# Patient Record
Sex: FEMALE | Race: WHITE | Employment: OTHER | URBAN - METROPOLITAN AREA
[De-identification: names, ages, dates, MRNs, and addresses within clinical notes are randomized per-mention and may not be internally consistent; named-entity substitution may affect disease eponyms.]

---

## 2017-09-05 ENCOUNTER — TRANSFERRED RECORDS (OUTPATIENT)
Dept: HEALTH INFORMATION MANAGEMENT | Facility: CLINIC | Age: 72
End: 2017-09-05

## 2017-09-13 ENCOUNTER — HOSPITAL ENCOUNTER (OUTPATIENT)
Dept: LAB | Facility: CLINIC | Age: 72
Discharge: HOME | End: 2017-09-13
Attending: ORTHOPAEDIC SURGERY | Admitting: ORTHOPAEDIC SURGERY
Payer: MEDICARE

## 2017-09-13 DIAGNOSIS — Z01.812 PRE-OPERATIVE LABORATORY EXAMINATION: ICD-10-CM

## 2017-09-13 PROCEDURE — 87641 MR-STAPH DNA AMP PROBE: CPT | Performed by: ORTHOPAEDIC SURGERY

## 2017-09-13 PROCEDURE — 87640 STAPH A DNA AMP PROBE: CPT | Performed by: ORTHOPAEDIC SURGERY

## 2017-09-13 PROCEDURE — 40000830 ZZHCL STATISTIC STAPH AUREUS METH RESIST SCREEN PCR: Performed by: ORTHOPAEDIC SURGERY

## 2017-09-13 PROCEDURE — 40000829 ZZHCL STATISTIC STAPH AUREUS SUSCEPT SCREEN PCR: Performed by: ORTHOPAEDIC SURGERY

## 2017-09-13 RX ORDER — MULTIPLE VITAMINS W/ MINERALS TAB 9MG-400MCG
1 TAB ORAL DAILY
COMMUNITY

## 2017-09-14 LAB
MRSA DNA SPEC QL NAA+PROBE: NEGATIVE
SPECIMEN SOURCE: NORMAL

## 2017-09-20 NOTE — PHARMACY-ADMISSION MEDICATION HISTORY
Admission medication history interview status for this patient is complete. See [facility] admission navigator for allergy information, prior to admission medications and immunization status.     Med rec completed by pre admitting Martina Torres RN Wed Sep 13, 2017  3:45 PM            Prior to Admission medications    Medication Sig Last Dose Taking? Auth Provider   multivitamin, therapeutic with minerals (MULTI-VITAMIN) TABS tablet Take 1 tablet by mouth daily  Yes Reported, Patient   acetaminophen (TYLENOL) 325 MG tablet Take 3 tablets (975 mg) by mouth every 8 hours  Yes Marialuisa Heard PA-C   LISINOPRIL PO Take 40 mg by mouth daily   Yes Reported, Patient   SIMVASTATIN PO Take 20 mg by mouth At Bedtime  Yes Reported, Patient   OMEPRAZOLE PO Take 20 mg by mouth every morning  Yes Reported, Patient   SUMATRIPTAN SUCCINATE PO Take 100 mg by mouth as needed for migraine (up to 1 dose daily prn)   Yes Reported, Patient

## 2017-09-25 ENCOUNTER — ANESTHESIA EVENT (OUTPATIENT)
Dept: SURGERY | Facility: CLINIC | Age: 72
DRG: 470 | End: 2017-09-25
Payer: MEDICARE

## 2017-09-25 ENCOUNTER — ANESTHESIA (OUTPATIENT)
Dept: SURGERY | Facility: CLINIC | Age: 72
DRG: 470 | End: 2017-09-25
Payer: MEDICARE

## 2017-09-25 ENCOUNTER — APPOINTMENT (OUTPATIENT)
Dept: GENERAL RADIOLOGY | Facility: CLINIC | Age: 72
DRG: 470 | End: 2017-09-25
Attending: ORTHOPAEDIC SURGERY
Payer: MEDICARE

## 2017-09-25 ENCOUNTER — APPOINTMENT (OUTPATIENT)
Dept: PHYSICAL THERAPY | Facility: CLINIC | Age: 72
DRG: 470 | End: 2017-09-25
Attending: ORTHOPAEDIC SURGERY
Payer: MEDICARE

## 2017-09-25 ENCOUNTER — HOSPITAL ENCOUNTER (INPATIENT)
Facility: CLINIC | Age: 72
LOS: 3 days | Discharge: HOME | DRG: 470 | End: 2017-09-28
Attending: ORTHOPAEDIC SURGERY | Admitting: ORTHOPAEDIC SURGERY
Payer: MEDICARE

## 2017-09-25 DIAGNOSIS — Z96.651 S/P TOTAL KNEE ARTHROPLASTY, RIGHT: Primary | ICD-10-CM

## 2017-09-25 PROBLEM — Z96.659 S/P TOTAL KNEE ARTHROPLASTY: Status: ACTIVE | Noted: 2017-09-25

## 2017-09-25 PROCEDURE — 25000128 H RX IP 250 OP 636: Performed by: ANESTHESIOLOGY

## 2017-09-25 PROCEDURE — 25000125 ZZHC RX 250: Performed by: NURSE ANESTHETIST, CERTIFIED REGISTERED

## 2017-09-25 PROCEDURE — 36000093 ZZH SURGERY LEVEL 4 1ST 30 MIN: Performed by: ORTHOPAEDIC SURGERY

## 2017-09-25 PROCEDURE — 0SRC0J9 REPLACEMENT OF RIGHT KNEE JOINT WITH SYNTHETIC SUBSTITUTE, CEMENTED, OPEN APPROACH: ICD-10-PCS | Performed by: ORTHOPAEDIC SURGERY

## 2017-09-25 PROCEDURE — 25000125 ZZHC RX 250: Performed by: ORTHOPAEDIC SURGERY

## 2017-09-25 PROCEDURE — C1776 JOINT DEVICE (IMPLANTABLE): HCPCS | Performed by: ORTHOPAEDIC SURGERY

## 2017-09-25 PROCEDURE — 25800025 ZZH RX 258: Performed by: ORTHOPAEDIC SURGERY

## 2017-09-25 PROCEDURE — 12000007 ZZH R&B INTERMEDIATE

## 2017-09-25 PROCEDURE — 99207 ZZC CONSULT E&M CHANGED TO INITIAL LEVEL: CPT | Performed by: INTERNAL MEDICINE

## 2017-09-25 PROCEDURE — 25000128 H RX IP 250 OP 636: Performed by: NURSE ANESTHETIST, CERTIFIED REGISTERED

## 2017-09-25 PROCEDURE — 97530 THERAPEUTIC ACTIVITIES: CPT | Mod: GP

## 2017-09-25 PROCEDURE — 25000132 ZZH RX MED GY IP 250 OP 250 PS 637: Mod: GY | Performed by: ORTHOPAEDIC SURGERY

## 2017-09-25 PROCEDURE — 25000128 H RX IP 250 OP 636: Performed by: ORTHOPAEDIC SURGERY

## 2017-09-25 PROCEDURE — 71000012 ZZH RECOVERY PHASE 1 LEVEL 1 FIRST HR: Performed by: ORTHOPAEDIC SURGERY

## 2017-09-25 PROCEDURE — 97161 PT EVAL LOW COMPLEX 20 MIN: CPT | Mod: GP

## 2017-09-25 PROCEDURE — 40000985 XR KNEE PORT RT 1/2 VW: Mod: RT

## 2017-09-25 PROCEDURE — 27810169 ZZH OR IMPLANT GENERAL: Performed by: ORTHOPAEDIC SURGERY

## 2017-09-25 PROCEDURE — 40000193 ZZH STATISTIC PT WARD VISIT

## 2017-09-25 PROCEDURE — 36000063 ZZH SURGERY LEVEL 4 EA 15 ADDTL MIN: Performed by: ORTHOPAEDIC SURGERY

## 2017-09-25 PROCEDURE — A9270 NON-COVERED ITEM OR SERVICE: HCPCS | Mod: GY | Performed by: PHYSICIAN ASSISTANT

## 2017-09-25 PROCEDURE — 25000125 ZZHC RX 250: Performed by: PHYSICIAN ASSISTANT

## 2017-09-25 PROCEDURE — 99222 1ST HOSP IP/OBS MODERATE 55: CPT | Performed by: INTERNAL MEDICINE

## 2017-09-25 PROCEDURE — A9270 NON-COVERED ITEM OR SERVICE: HCPCS | Mod: GY | Performed by: ORTHOPAEDIC SURGERY

## 2017-09-25 PROCEDURE — 71000013 ZZH RECOVERY PHASE 1 LEVEL 1 EA ADDTL HR: Performed by: ORTHOPAEDIC SURGERY

## 2017-09-25 PROCEDURE — 25000125 ZZHC RX 250: Performed by: ANESTHESIOLOGY

## 2017-09-25 PROCEDURE — 27210794 ZZH OR GENERAL SUPPLY STERILE: Performed by: ORTHOPAEDIC SURGERY

## 2017-09-25 PROCEDURE — 37000009 ZZH ANESTHESIA TECHNICAL FEE, EACH ADDTL 15 MIN: Performed by: ORTHOPAEDIC SURGERY

## 2017-09-25 PROCEDURE — 25000566 ZZH SEVOFLURANE, EA 15 MIN: Performed by: ORTHOPAEDIC SURGERY

## 2017-09-25 PROCEDURE — 25000132 ZZH RX MED GY IP 250 OP 250 PS 637: Mod: GY | Performed by: PHYSICIAN ASSISTANT

## 2017-09-25 PROCEDURE — 37000008 ZZH ANESTHESIA TECHNICAL FEE, 1ST 30 MIN: Performed by: ORTHOPAEDIC SURGERY

## 2017-09-25 PROCEDURE — 40000171 ZZH STATISTIC PRE-PROCEDURE ASSESSMENT III: Performed by: ORTHOPAEDIC SURGERY

## 2017-09-25 PROCEDURE — 97110 THERAPEUTIC EXERCISES: CPT | Mod: GP

## 2017-09-25 PROCEDURE — 25000128 H RX IP 250 OP 636: Performed by: PHYSICIAN ASSISTANT

## 2017-09-25 DEVICE — BONE CEMENT SIMPLEX FULL DOSE 6191-1-001: Type: IMPLANTABLE DEVICE | Site: KNEE | Status: FUNCTIONAL

## 2017-09-25 DEVICE — IMP ART SURFACE ZIM NEXGEN LPS EF 3-4 12MM 00-5964-032-12: Type: IMPLANTABLE DEVICE | Site: KNEE | Status: FUNCTIONAL

## 2017-09-25 DEVICE — IMP COMP PATELLA ZIM NEXGEN 8.0X29MM 00-5972-065-29: Type: IMPLANTABLE DEVICE | Site: KNEE | Status: FUNCTIONAL

## 2017-09-25 DEVICE — IMP COMP TIBIAL STEMMED ZIM NEXGEN SIZE 3 5980-37-01: Type: IMPLANTABLE DEVICE | Site: KNEE | Status: FUNCTIONAL

## 2017-09-25 DEVICE — IMP COMP FEMORAL E RT 00-5764-015-52: Type: IMPLANTABLE DEVICE | Site: KNEE | Status: FUNCTIONAL

## 2017-09-25 RX ORDER — CELECOXIB 200 MG/1
400 CAPSULE ORAL ONCE
Status: COMPLETED | OUTPATIENT
Start: 2017-09-25 | End: 2017-09-25

## 2017-09-25 RX ORDER — GLYCOPYRROLATE 0.2 MG/ML
INJECTION, SOLUTION INTRAMUSCULAR; INTRAVENOUS PRN
Status: DISCONTINUED | OUTPATIENT
Start: 2017-09-25 | End: 2017-09-25

## 2017-09-25 RX ORDER — ACETAMINOPHEN 325 MG/1
975 TABLET ORAL EVERY 8 HOURS
Status: DISCONTINUED | OUTPATIENT
Start: 2017-09-25 | End: 2017-09-28 | Stop reason: HOSPADM

## 2017-09-25 RX ORDER — ONDANSETRON 2 MG/ML
4 INJECTION INTRAMUSCULAR; INTRAVENOUS EVERY 6 HOURS PRN
Status: DISCONTINUED | OUTPATIENT
Start: 2017-09-25 | End: 2017-09-28 | Stop reason: HOSPADM

## 2017-09-25 RX ORDER — SODIUM CHLORIDE, SODIUM LACTATE, POTASSIUM CHLORIDE, CALCIUM CHLORIDE 600; 310; 30; 20 MG/100ML; MG/100ML; MG/100ML; MG/100ML
INJECTION, SOLUTION INTRAVENOUS CONTINUOUS
Status: DISCONTINUED | OUTPATIENT
Start: 2017-09-25 | End: 2017-09-25 | Stop reason: HOSPADM

## 2017-09-25 RX ORDER — CEFAZOLIN SODIUM 2 G/100ML
2 INJECTION, SOLUTION INTRAVENOUS
Status: COMPLETED | OUTPATIENT
Start: 2017-09-25 | End: 2017-09-25

## 2017-09-25 RX ORDER — OXYCODONE HCL 10 MG/1
10 TABLET, FILM COATED, EXTENDED RELEASE ORAL EVERY 12 HOURS
Status: DISCONTINUED | OUTPATIENT
Start: 2017-09-25 | End: 2017-09-27

## 2017-09-25 RX ORDER — HYDROMORPHONE HYDROCHLORIDE 1 MG/ML
.3-.5 INJECTION, SOLUTION INTRAMUSCULAR; INTRAVENOUS; SUBCUTANEOUS
Status: DISCONTINUED | OUTPATIENT
Start: 2017-09-25 | End: 2017-09-27

## 2017-09-25 RX ORDER — ONDANSETRON 4 MG/1
4 TABLET, ORALLY DISINTEGRATING ORAL EVERY 6 HOURS PRN
Status: DISCONTINUED | OUTPATIENT
Start: 2017-09-25 | End: 2017-09-28 | Stop reason: HOSPADM

## 2017-09-25 RX ORDER — GABAPENTIN 100 MG/1
100 CAPSULE ORAL 3 TIMES DAILY
Status: DISPENSED | OUTPATIENT
Start: 2017-09-25 | End: 2017-09-28

## 2017-09-25 RX ORDER — GABAPENTIN 100 MG/1
100 CAPSULE ORAL
Status: COMPLETED | OUTPATIENT
Start: 2017-09-25 | End: 2017-09-25

## 2017-09-25 RX ORDER — CEFAZOLIN SODIUM 1 G/3ML
1 INJECTION, POWDER, FOR SOLUTION INTRAMUSCULAR; INTRAVENOUS SEE ADMIN INSTRUCTIONS
Status: DISCONTINUED | OUTPATIENT
Start: 2017-09-25 | End: 2017-09-25 | Stop reason: HOSPADM

## 2017-09-25 RX ORDER — PROCHLORPERAZINE MALEATE 5 MG
5 TABLET ORAL EVERY 6 HOURS PRN
Status: DISCONTINUED | OUTPATIENT
Start: 2017-09-25 | End: 2017-09-28 | Stop reason: HOSPADM

## 2017-09-25 RX ORDER — LISINOPRIL 40 MG/1
40 TABLET ORAL DAILY
Status: DISCONTINUED | OUTPATIENT
Start: 2017-09-26 | End: 2017-09-26

## 2017-09-25 RX ORDER — ACETAMINOPHEN 325 MG/1
650 TABLET ORAL EVERY 4 HOURS PRN
Status: DISCONTINUED | OUTPATIENT
Start: 2017-09-28 | End: 2017-09-28 | Stop reason: HOSPADM

## 2017-09-25 RX ORDER — SODIUM CHLORIDE, SODIUM LACTATE, POTASSIUM CHLORIDE, CALCIUM CHLORIDE 600; 310; 30; 20 MG/100ML; MG/100ML; MG/100ML; MG/100ML
INJECTION, SOLUTION INTRAVENOUS CONTINUOUS
Status: DISCONTINUED | OUTPATIENT
Start: 2017-09-25 | End: 2017-09-27

## 2017-09-25 RX ORDER — ONDANSETRON 4 MG/1
4 TABLET, ORALLY DISINTEGRATING ORAL EVERY 30 MIN PRN
Status: DISCONTINUED | OUTPATIENT
Start: 2017-09-25 | End: 2017-09-25 | Stop reason: HOSPADM

## 2017-09-25 RX ORDER — SCOLOPAMINE TRANSDERMAL SYSTEM 1 MG/1
1 PATCH, EXTENDED RELEASE TRANSDERMAL
Status: DISCONTINUED | OUTPATIENT
Start: 2017-09-25 | End: 2017-09-25 | Stop reason: HOSPADM

## 2017-09-25 RX ORDER — PROPOFOL 10 MG/ML
INJECTION, EMULSION INTRAVENOUS CONTINUOUS PRN
Status: DISCONTINUED | OUTPATIENT
Start: 2017-09-25 | End: 2017-09-25

## 2017-09-25 RX ORDER — ONDANSETRON 2 MG/ML
4 INJECTION INTRAMUSCULAR; INTRAVENOUS EVERY 30 MIN PRN
Status: DISCONTINUED | OUTPATIENT
Start: 2017-09-25 | End: 2017-09-25 | Stop reason: HOSPADM

## 2017-09-25 RX ORDER — LIDOCAINE 40 MG/G
CREAM TOPICAL
Status: DISCONTINUED | OUTPATIENT
Start: 2017-09-25 | End: 2017-09-28 | Stop reason: HOSPADM

## 2017-09-25 RX ORDER — TRAMADOL HYDROCHLORIDE 50 MG/1
50 TABLET ORAL EVERY 6 HOURS PRN
Status: DISCONTINUED | OUTPATIENT
Start: 2017-09-25 | End: 2017-09-28 | Stop reason: HOSPADM

## 2017-09-25 RX ORDER — FERROUS GLUCONATE 324(38)MG
324 TABLET ORAL DAILY
Status: DISCONTINUED | OUTPATIENT
Start: 2017-09-25 | End: 2017-09-28 | Stop reason: HOSPADM

## 2017-09-25 RX ORDER — DEXAMETHASONE SODIUM PHOSPHATE 4 MG/ML
INJECTION, SOLUTION INTRA-ARTICULAR; INTRALESIONAL; INTRAMUSCULAR; INTRAVENOUS; SOFT TISSUE PRN
Status: DISCONTINUED | OUTPATIENT
Start: 2017-09-25 | End: 2017-09-25

## 2017-09-25 RX ORDER — HYDROMORPHONE HYDROCHLORIDE 1 MG/ML
.3-.5 INJECTION, SOLUTION INTRAMUSCULAR; INTRAVENOUS; SUBCUTANEOUS EVERY 5 MIN PRN
Status: DISCONTINUED | OUTPATIENT
Start: 2017-09-25 | End: 2017-09-25 | Stop reason: HOSPADM

## 2017-09-25 RX ORDER — FENTANYL CITRATE 50 UG/ML
INJECTION, SOLUTION INTRAMUSCULAR; INTRAVENOUS PRN
Status: DISCONTINUED | OUTPATIENT
Start: 2017-09-25 | End: 2017-09-25

## 2017-09-25 RX ORDER — HYDRALAZINE HYDROCHLORIDE 20 MG/ML
2.5-5 INJECTION INTRAMUSCULAR; INTRAVENOUS EVERY 10 MIN PRN
Status: DISCONTINUED | OUTPATIENT
Start: 2017-09-25 | End: 2017-09-25 | Stop reason: HOSPADM

## 2017-09-25 RX ORDER — HYDROXYZINE HYDROCHLORIDE 50 MG/1
50 TABLET, FILM COATED ORAL EVERY 6 HOURS PRN
Status: DISCONTINUED | OUTPATIENT
Start: 2017-09-25 | End: 2017-09-28 | Stop reason: HOSPADM

## 2017-09-25 RX ORDER — CELECOXIB 200 MG/1
200 CAPSULE ORAL DAILY
Status: DISCONTINUED | OUTPATIENT
Start: 2017-09-26 | End: 2017-09-28 | Stop reason: HOSPADM

## 2017-09-25 RX ORDER — NALOXONE HYDROCHLORIDE 0.4 MG/ML
.1-.4 INJECTION, SOLUTION INTRAMUSCULAR; INTRAVENOUS; SUBCUTANEOUS
Status: DISCONTINUED | OUTPATIENT
Start: 2017-09-25 | End: 2017-09-28 | Stop reason: HOSPADM

## 2017-09-25 RX ORDER — LIDOCAINE HYDROCHLORIDE 10 MG/ML
INJECTION, SOLUTION INFILTRATION; PERINEURAL PRN
Status: DISCONTINUED | OUTPATIENT
Start: 2017-09-25 | End: 2017-09-25

## 2017-09-25 RX ORDER — FENTANYL CITRATE 50 UG/ML
25-50 INJECTION, SOLUTION INTRAMUSCULAR; INTRAVENOUS
Status: DISCONTINUED | OUTPATIENT
Start: 2017-09-25 | End: 2017-09-25 | Stop reason: HOSPADM

## 2017-09-25 RX ORDER — LIDOCAINE 40 MG/G
CREAM TOPICAL
Status: DISCONTINUED | OUTPATIENT
Start: 2017-09-25 | End: 2017-09-25 | Stop reason: HOSPADM

## 2017-09-25 RX ORDER — PROPOFOL 10 MG/ML
INJECTION, EMULSION INTRAVENOUS PRN
Status: DISCONTINUED | OUTPATIENT
Start: 2017-09-25 | End: 2017-09-25

## 2017-09-25 RX ORDER — SUMATRIPTAN 50 MG/1
100 TABLET, FILM COATED ORAL DAILY PRN
Status: DISCONTINUED | OUTPATIENT
Start: 2017-09-25 | End: 2017-09-28 | Stop reason: HOSPADM

## 2017-09-25 RX ORDER — OXYCODONE HYDROCHLORIDE 5 MG/1
5-10 TABLET ORAL EVERY 4 HOURS PRN
Status: DISCONTINUED | OUTPATIENT
Start: 2017-09-25 | End: 2017-09-28 | Stop reason: HOSPADM

## 2017-09-25 RX ORDER — DIPHENHYDRAMINE HCL 12.5MG/5ML
12.5 LIQUID (ML) ORAL EVERY 6 HOURS PRN
Status: DISCONTINUED | OUTPATIENT
Start: 2017-09-25 | End: 2017-09-28 | Stop reason: HOSPADM

## 2017-09-25 RX ORDER — DIPHENHYDRAMINE HYDROCHLORIDE 50 MG/ML
12.5 INJECTION INTRAMUSCULAR; INTRAVENOUS EVERY 6 HOURS PRN
Status: DISCONTINUED | OUTPATIENT
Start: 2017-09-25 | End: 2017-09-28 | Stop reason: HOSPADM

## 2017-09-25 RX ORDER — SIMVASTATIN 20 MG
20 TABLET ORAL AT BEDTIME
Status: DISCONTINUED | OUTPATIENT
Start: 2017-09-26 | End: 2017-09-28 | Stop reason: HOSPADM

## 2017-09-25 RX ORDER — LABETALOL HYDROCHLORIDE 5 MG/ML
10 INJECTION, SOLUTION INTRAVENOUS
Status: DISCONTINUED | OUTPATIENT
Start: 2017-09-25 | End: 2017-09-25 | Stop reason: HOSPADM

## 2017-09-25 RX ORDER — OXYCODONE HCL 10 MG/1
10 TABLET, FILM COATED, EXTENDED RELEASE ORAL ONCE
Status: COMPLETED | OUTPATIENT
Start: 2017-09-25 | End: 2017-09-25

## 2017-09-25 RX ORDER — AMOXICILLIN 250 MG
1-2 CAPSULE ORAL 2 TIMES DAILY
Status: DISCONTINUED | OUTPATIENT
Start: 2017-09-25 | End: 2017-09-28 | Stop reason: HOSPADM

## 2017-09-25 RX ORDER — TRAMADOL HYDROCHLORIDE 50 MG/1
100 TABLET ORAL EVERY 6 HOURS PRN
Status: DISCONTINUED | OUTPATIENT
Start: 2017-09-25 | End: 2017-09-28 | Stop reason: HOSPADM

## 2017-09-25 RX ORDER — HYDROXYZINE HYDROCHLORIDE 25 MG/1
25 TABLET, FILM COATED ORAL EVERY 6 HOURS PRN
Status: DISCONTINUED | OUTPATIENT
Start: 2017-09-25 | End: 2017-09-28 | Stop reason: HOSPADM

## 2017-09-25 RX ADMIN — ACETAMINOPHEN 975 MG: 325 TABLET, FILM COATED ORAL at 13:30

## 2017-09-25 RX ADMIN — ACETAMINOPHEN 975 MG: 325 TABLET, FILM COATED ORAL at 23:00

## 2017-09-25 RX ADMIN — HYDROMORPHONE HYDROCHLORIDE 0.5 MG: 1 INJECTION, SOLUTION INTRAMUSCULAR; INTRAVENOUS; SUBCUTANEOUS at 07:39

## 2017-09-25 RX ADMIN — Medication 1 G: at 07:37

## 2017-09-25 RX ADMIN — FENTANYL CITRATE 50 MCG: 50 INJECTION INTRAMUSCULAR; INTRAVENOUS at 09:45

## 2017-09-25 RX ADMIN — GLYCOPYRROLATE 0.2 MG: 0.2 INJECTION, SOLUTION INTRAMUSCULAR; INTRAVENOUS at 07:30

## 2017-09-25 RX ADMIN — DEXAMETHASONE SODIUM PHOSPHATE 4 MG: 4 INJECTION, SOLUTION INTRA-ARTICULAR; INTRALESIONAL; INTRAMUSCULAR; INTRAVENOUS; SOFT TISSUE at 07:30

## 2017-09-25 RX ADMIN — OXYCODONE HYDROCHLORIDE 10 MG: 10 TABLET, FILM COATED, EXTENDED RELEASE ORAL at 06:37

## 2017-09-25 RX ADMIN — PROPOFOL 50 MCG/KG/MIN: 10 INJECTION, EMULSION INTRAVENOUS at 07:45

## 2017-09-25 RX ADMIN — SCOPOLAMINE 1 PATCH: 1 PATCH, EXTENDED RELEASE TRANSDERMAL at 06:19

## 2017-09-25 RX ADMIN — SODIUM CHLORIDE, POTASSIUM CHLORIDE, SODIUM LACTATE AND CALCIUM CHLORIDE: 600; 310; 30; 20 INJECTION, SOLUTION INTRAVENOUS at 19:55

## 2017-09-25 RX ADMIN — PROPOFOL 160 MG: 10 INJECTION, EMULSION INTRAVENOUS at 07:30

## 2017-09-25 RX ADMIN — MIDAZOLAM HYDROCHLORIDE 2 MG: 1 INJECTION, SOLUTION INTRAMUSCULAR; INTRAVENOUS at 06:59

## 2017-09-25 RX ADMIN — FERROUS GLUCONATE 324 MG: 324 TABLET ORAL at 19:15

## 2017-09-25 RX ADMIN — SENNOSIDES AND DOCUSATE SODIUM 1 TABLET: 8.6; 5 TABLET ORAL at 19:15

## 2017-09-25 RX ADMIN — FENTANYL CITRATE 25 MCG: 50 INJECTION INTRAMUSCULAR; INTRAVENOUS at 09:30

## 2017-09-25 RX ADMIN — FENTANYL CITRATE 50 MCG: 50 INJECTION, SOLUTION INTRAMUSCULAR; INTRAVENOUS at 07:33

## 2017-09-25 RX ADMIN — CEFAZOLIN SODIUM 1 G: 1 INJECTION, SOLUTION INTRAVENOUS at 14:12

## 2017-09-25 RX ADMIN — SODIUM CHLORIDE, POTASSIUM CHLORIDE, SODIUM LACTATE AND CALCIUM CHLORIDE: 600; 310; 30; 20 INJECTION, SOLUTION INTRAVENOUS at 10:22

## 2017-09-25 RX ADMIN — GABAPENTIN 100 MG: 100 CAPSULE ORAL at 19:15

## 2017-09-25 RX ADMIN — OXYCODONE HYDROCHLORIDE 10 MG: 5 TABLET ORAL at 13:30

## 2017-09-25 RX ADMIN — FENTANYL CITRATE 50 MCG: 50 INJECTION, SOLUTION INTRAMUSCULAR; INTRAVENOUS at 07:30

## 2017-09-25 RX ADMIN — HYDROMORPHONE HYDROCHLORIDE 0.5 MG: 1 INJECTION, SOLUTION INTRAMUSCULAR; INTRAVENOUS; SUBCUTANEOUS at 11:11

## 2017-09-25 RX ADMIN — ASPIRIN 325 MG: 325 TABLET, DELAYED RELEASE ORAL at 13:29

## 2017-09-25 RX ADMIN — ONDANSETRON 4 MG: 4 TABLET, ORALLY DISINTEGRATING ORAL at 13:28

## 2017-09-25 RX ADMIN — SODIUM CHLORIDE, POTASSIUM CHLORIDE, SODIUM LACTATE AND CALCIUM CHLORIDE: 600; 310; 30; 20 INJECTION, SOLUTION INTRAVENOUS at 08:09

## 2017-09-25 RX ADMIN — CEFAZOLIN SODIUM 2 G: 2 INJECTION, SOLUTION INTRAVENOUS at 07:26

## 2017-09-25 RX ADMIN — CELECOXIB 400 MG: 200 CAPSULE ORAL at 06:37

## 2017-09-25 RX ADMIN — GABAPENTIN 100 MG: 100 CAPSULE ORAL at 06:37

## 2017-09-25 RX ADMIN — LIDOCAINE HYDROCHLORIDE 25 MG: 10 INJECTION, SOLUTION INFILTRATION; PERINEURAL at 07:30

## 2017-09-25 RX ADMIN — TRAMADOL HYDROCHLORIDE 50 MG: 50 TABLET, COATED ORAL at 19:15

## 2017-09-25 RX ADMIN — Medication 1 G: at 08:43

## 2017-09-25 RX ADMIN — GABAPENTIN 100 MG: 100 CAPSULE ORAL at 13:30

## 2017-09-25 RX ADMIN — ASPIRIN 325 MG: 325 TABLET, DELAYED RELEASE ORAL at 19:15

## 2017-09-25 RX ADMIN — PHENYLEPHRINE HYDROCHLORIDE 50 MCG: 10 INJECTION, SOLUTION INTRAMUSCULAR; INTRAVENOUS; SUBCUTANEOUS at 07:41

## 2017-09-25 RX ADMIN — HYDROMORPHONE HYDROCHLORIDE 0.5 MG: 1 INJECTION, SOLUTION INTRAMUSCULAR; INTRAVENOUS; SUBCUTANEOUS at 09:36

## 2017-09-25 RX ADMIN — FENTANYL CITRATE 50 MCG: 50 INJECTION INTRAMUSCULAR; INTRAVENOUS at 09:05

## 2017-09-25 RX ADMIN — HYDROMORPHONE HYDROCHLORIDE 0.5 MG: 1 INJECTION, SOLUTION INTRAMUSCULAR; INTRAVENOUS; SUBCUTANEOUS at 08:51

## 2017-09-25 RX ADMIN — SODIUM CHLORIDE, POTASSIUM CHLORIDE, SODIUM LACTATE AND CALCIUM CHLORIDE: 600; 310; 30; 20 INJECTION, SOLUTION INTRAVENOUS at 07:26

## 2017-09-25 RX ADMIN — FENTANYL CITRATE 25 MCG: 50 INJECTION INTRAMUSCULAR; INTRAVENOUS at 09:22

## 2017-09-25 RX ADMIN — CEFAZOLIN SODIUM 1 G: 1 INJECTION, SOLUTION INTRAVENOUS at 23:00

## 2017-09-25 NOTE — ANESTHESIA PREPROCEDURE EVALUATION
Anesthesia Evaluation     . Pt has had prior anesthetic.     History of anesthetic complications   - PONV        ROS/MED HX    ENT/Pulmonary:       Neurologic:     (+)migraines,     Cardiovascular:     (+) Dyslipidemia, hypertension----. : . . . :. .       METS/Exercise Tolerance:     Hematologic:         Musculoskeletal:   (+) arthritis, , , -       GI/Hepatic:     (+) GERD       Renal/Genitourinary:         Endo:         Psychiatric:         Infectious Disease:         Malignancy:         Other:                     Physical Exam  Normal systems: cardiovascular, pulmonary and dental    Airway   Mallampati: II  TM distance: >3 FB  Neck ROM: full    Dental     Cardiovascular       Pulmonary                     Anesthesia Plan      History & Physical Review  History and physical reviewed and following examination; no interval change.    ASA Status:  2 .    NPO Status:  > 8 hours    Plan for General, Periph. Nerve Block for postop pain and ETT with Intravenous and Propofol induction. Maintenance will be Balanced.    PONV prophylaxis:  Ondansetron (or other 5HT-3), Dexamethasone or Solumedrol and Scopolamine patch       Postoperative Care  Postoperative pain management:  IV analgesics.      Consents  Anesthetic plan, risks, benefits and alternatives discussed with:  Patient.  Use of blood products discussed: Yes.   Use of blood products discussed with Patient.  Consented to blood products.  .                          .

## 2017-09-25 NOTE — PLAN OF CARE
Problem: Patient Care Overview  Goal: Plan of Care/Patient Progress Review  Pt has not been OOB yet. Up to floor at 1100.  PT scheduled for 1600.  Full liquid diet but has only wanted clears at this point.  Has scope patch.  Zofran tab given x1.  Started Oxycodone, tylenol, Neurontin, and aspirin.  She anticipates getting nauseated with PT, per patient she always does the first time she gets up.  Has edwards.  Good output.  Hemovac compressed.  Instructed on IS but sleepy so may need further instruction.

## 2017-09-25 NOTE — LETTER
Transition Communication Hand-off for Care Transitions to Next Level of Care Provider    Name: Dilia Jones  MRN #: [id number]  Primary Care Provider: [facility]     Primary Clinic: [address] [facility] [address]     Reason for Hospitalization:  DJD  S/P total knee arthroplasty  Admit Date/Time: 9/25/2017  5:25 AM  Discharge Date: 9/28/2017   Payor Source: Payor: BCBS / Plan: [employer] PLATINUM BLUE / Product Type: PPO /     Readmission Assessment Measure (JUDAH) Risk Score/category: LOW     Discharge Home with Assist of daughter     Follow-up specialty is recommended: Yes  , Orthopedic    Return to clinic in 10-14 days.  Call [phone] to schedule or if you experience any problems before your scheduled appointment    Follow-up plan:   The Doctor has recommended that you have your labs drawn at your primary doctors office 2-3 days Your appointment scheduled for  1100 am at [facility], lab appointment only, for BMP, orders have been faxed to  and Dr. Mejia updated.     Any outstanding tests or procedures:        Referrals     Future Labs/Procedures    Physical Therapy Referral     Comments:    *This therapy referral will be filtered to a centralized scheduling office at [facility] and the patient will receive a call to schedule an appointment at a [place] location most convenient for them. *     For [facility] scheduling, Call [phone]  Treatment: Evaluation & Treatment  Special Instructions/Modalities: Physical therapy to be done at [facility]  Call to set up appointment if not already scheduled; [phone]  Special Programs: Physical therapy to be done at [facility]  Call to set up appointment if not already scheduled; [phone]    Please be aware that coverage of these services is subject to the terms and limitations of your health insurance plan.  Call member services at your  "health plan with any benefit or coverage questions.      **Note to Provider:  If you are referring outside of [place] for the therapy appointment, please list the name of the location in the \"special instructions\" above, print the referral and give to the patient to schedule the appointment.            Key Recommendations:    Post operative--Hyponatremia,  likely SIADH response due to severe nausea and HA, now improved  Would recommend repeat BMP in 2-3 days as outpatient, soonest to get into clinic was scheduled for Monday at 1100, if abnormal have Dr. Mejia address. Thanks              APPOINTMENT HAS BEEN SCHEDULED AS ABOVE    Magalis Hinton    AVS/Discharge Summary is the source of truth; this is a helpful guide for improved communication of patient story          "

## 2017-09-25 NOTE — PLAN OF CARE
Problem: Patient Care Overview  Goal: Plan of Care/Patient Progress Review  Discharge Planner PT      PT: Orders received, chart reviewed, eval completed, and treatment initiated. Pt is POD #0 following R TKA. Pt states she lives with her adult daughter in a house with 1 step to enter. She states her home has been set up so she is able to stay on the main floor initially. She reports being independent with no AD at baseline.      Patient plan for discharge: Pt states home  Current status: Pt requires min A for bed mobility, CGA for sit to/from stand, and ambulates 4ft with fww and CGA. SLR is independent, no KI needed. R knee ROM 12-67.   Barriers to return to prior living situation: step to enter, level of assist, falls risk   Recommendations for discharge: TBD POD #2  Rationale for recommendations: TBD POD #2       Entered by: Liset Briggs 09/25/2017 5:01 PM

## 2017-09-25 NOTE — IP AVS SNAPSHOT
MRN:[id number]                      After Visit Summary   9/25/2017    Dilia Jones    MRN: [id number]           Thank you!     Thank you for choosing [facility] for your care. Our goal is always to provide you with excellent care. Hearing back from our patients is one way we can continue to improve our services. Please take a few minutes to complete the written survey that you may receive in the mail after you visit. If you would like to speak to someone directly about your visit please contact Patient Relations at [phone]. Thank you!          Patient Information     Date Of Birth          1945        Designated Caregiver       Most Recent Value    Caregiver    Will someone help with your care after discharge? yes    Name of designated caregiver Phoebe: dtr    Phone number of caregiver [phone]    Caregiver address same address      About your hospital stay     You were admitted on:  September 25, 2017 You last received care in the:  [facility]    You were discharged on:  September 28, 2017        Reason for your hospital stay       Diagnosis: right DJD knee  Procedure: right Cemented total knee replacement  Surgeon: Pedro Barnett MD  Patient underwent total knee replacement without complication. Patient had typical transient post-op anemia. Patient's hospital stay included physical therapy and DVT prophylaxis. After discussion with patient regarding no history of DVT and current high mobility, patient is discharged with ASA for home DVT pphx. Patient will follow -up in the office 10-14days post-op for wound check. Please refer to chart for any other specifics of this hospital stay.  Marialuisa Heard PA-C                  Who to Call     For medical emergencies, please call [phone].  For non-urgent questions about your medical care, please call your primary care provider or clinic, [phone]  For questions related to your surgery, please call your  surgery clinic        Attending Provider     Provider Specialty    Pedro Barnett MD Orthopedics       Primary Care Provider Office Phone # Fax #    [facility] [phone]      After Care Instructions     Activity       Your activity upon discharge: activity as tolerated            Diet       Follow this diet upon discharge: Orders Placed This Encounter      Advance Diet as Tolerated: Regular Diet Adult            Supplies       List the supplies the pt needs to go home: Omega stockings measured and applied for home use. May remove QHS but should be word during day.            Wound care and dressings       Instructions to care for your wound at home: Keep waterproof dressing in place until post-op visit with Dr Barnett. (10-14 days from surgery)                  Follow-up Appointments     Follow-up and recommended labs and tests        Follow-up with Dr Barnett for wound check in 10-14 days. Call for appointment [phone].                  Additional Services     Physical Therapy Referral       *This therapy referral will be filtered to a centralized scheduling office at [facility] and the patient will receive a call to schedule an appointment at a [facility] location most convenient for them. *     For [facility] scheduling, Call [phone]  Treatment: Evaluation & Treatment  Special Instructions/Modalities: Physical therapy to be done at [facility]  Call to set up appointment if not already scheduled; [phone]  Special Programs: Physical therapy to be done at [facility]  Call to set up appointment if not already scheduled; [phone]    Please be aware that coverage of these services is subject to the terms and limitations of your health insurance plan.  Call member services at your health plan with any benefit or coverage questions.      **Note to Provider:  If you are referring outside of [facility] for  "the therapy appointment, please list the name of the location in the \"special instructions\" above, print the referral and give to the patient to schedule the appointment.                  Future tests that were ordered for you     BMP: Laboratory Miscellaneous Order       Lab Instructions:  Order test:  LSMISC, ARMISC or MMMISC      Manual billing:  REQUIRED.                          Further instructions from your care team       The Doctor has recommended that you have your labs drawn at your primary doctors office 2-3 days Your appointment scheduled for  1100 am at [facility], lab appointment only, for BMP, orders have been faxed to  and Dr. Mejia updated.     Return to clinic in 10-14 days.  Call [phone] to schedule or if you experience any problems before your scheduled appointment.      1. Do exercises at home as instructed by therapist twice a day. Continue outpatient therapy as ordered by your doctor.  2. Ice knee after exercises and therapy.  3. Examine dressing daily for problems.  4. May shower, can get dressing wet, no soaking (no bathtubs, pools or hot tubs)  5. Notify your dentist or physician of your implant so you can get antibiotics before any dental work or before any invasive procedure (ie: colonoscopy)  6. Remove anti-embolism stockings (Omega hose) at bedtime and reapply during the day to prevent swelling.      Aquacel dressing:  DO NOT CHANGE DRESSING DAILY.  Leave this dressing on until follow-up appointment with Orthopedic Surgeon.  Dressing is waterproof, can shower with it on, pat dry when done.  No soaking such as in tub baths, pools or hot tubs        While on narcotic pain medication, to prevent constipation:  1. Drink plenty of water to keep well hydrated   2. May take over the counter Colace or Senna (follow instructions on label)        Call your physician if: ([phone])   1. Persistent fever greater than 101 degrees with body chills or " "excessive sweating.  2. Increased/persistent redness, localized warmth, tenderness, drainage or swelling at dressing site. Greater than 50% drainage on dressing.   3. Persistent pain not controlled with oral pain medications, ice and rest.   4. No bowel movement in 3 days (may use Milk of Magnesia or other over the counter remedy).  5. Chest pain, shortness of breath, and/or calf pain with excessive swelling.  6. Generalized feeling of illness.  7. Any other questions or concerns related to your surgery/recovery.    Thank you for allowing [facility] to participate in your cares!!          Pending Results     Date and Time Order Name Status Description    9/5/2017 0000 EKG CARDIAC - HIM SCAN Preliminary             Statement of Approval     Ordered          09/27/17 0702  I have reviewed and agree with all the recommendations and orders detailed in this document.  EFFECTIVE NOW     Approved and electronically signed by:  Marialuisa Heard PA-C             Admission Information     Date & Time Provider Department Dept. Phone    9/25/2017 Pedro Barnett MD [facility] [phone]      Your Vitals Were     Blood Pressure Pulse Temperature Respirations Height Weight    133/81 (BP Location: Left arm) 63 96.3  F (35.7  C) (Oral) 16 1.575 m (5' 2\") 77.1 kg (170 lb)    Pulse Oximetry BMI (Body Mass Index)                92% 31.09 kg/m2          MyChart Information     [employer] lets you send messages to your doctor, view your test results, renew your prescriptions, schedule appointments and more. To sign up, go to [email or web address] . Click on \"Log in\" on the left side of the screen, which will take you to the Welcome page. Then click on \"Sign up Now\" on the right side of the page.     You will be asked to enter the access code listed below, as well as some personal information. Please follow the directions to create your username and password.     Your access code is: " [email or web address]  Expires: 2017  9:53 AM     Your access code will  in 90 days. If you need help or a new code, please call your [place] clinic or [phone].        Care EveryWhere ID     This is your Care EveryWhere ID. This could be used by other organizations to access your [place] medical records  [id number]        Equal Access to Services     [facility] Center[facility] : Hadii kelvin ku hadasho Sodavidali, waaxda luqadaha, qaybta kaalmada adeegyada, waxnighat arnav hayletin aderahat marcus laotf . So [facility] [phone].    ATENCIÓN: Si davidla mateuszharlan, tiene a munroe disposición servicios gratuitos de asistencia lingüística. Lily al [phone].    We comply with applicable federal civil rights laws and [place] laws. We do not discriminate on the basis of race, color, national origin, age, disability sex, sexual orientation or gender identity.               Review of your medicines      START taking        Dose / Directions    aspirin 325 MG EC tablet        Dose:  325 mg   Take 1 tablet (325 mg) by mouth 2 times daily   Quantity:  60 tablet   Refills:  0       hydrOXYzine 25 MG tablet   Commonly known as:  ATARAX   Notes to Patient:  Also used for muscle cramps/spasms        Dose:  25 mg   Take 1 tablet (25 mg) by mouth every 6 hours as needed for other (adjuvant pain)   Quantity:  60 tablet   Refills:  0       senna-docusate 8.6-50 MG per tablet   Commonly known as:  SENOKOT-S;PERICOLACE        Dose:  1-2 tablet   Take 1-2 tablets by mouth 2 times daily as needed for constipation   Quantity:  60 tablet   Refills:  0       traMADol 50 MG tablet   Commonly known as:  ULTRAM        1 tab po q 6 hrs prn pain scale 1-5,  2 tabs po q 6hrs prn pain scale 6-10   Quantity:  65 tablet   Refills:  0         CONTINUE these medicines which have NOT CHANGED        Dose / Directions    acetaminophen 325 MG tablet   Commonly known as:  TYLENOL        Dose:  975 mg   Take 3 tablets (975 mg) by mouth every 8 hours   Quantity:  200 tablet    Refills:  1       LISINOPRIL PO        Dose:  40 mg   Take 40 mg by mouth daily   Refills:  0       Multi-vitamin Tabs tablet   Notes to Patient:  Home schedule        Dose:  1 tablet   Take 1 tablet by mouth daily   Refills:  0       OMEPRAZOLE PO        Dose:  20 mg   Take 20 mg by mouth every morning   Refills:  0       SIMVASTATIN PO        Dose:  20 mg   Take 20 mg by mouth At Bedtime   Refills:  0       SUMATRIPTAN SUCCINATE PO        Dose:  100 mg   Take 100 mg by mouth as needed for migraine (up to 1 dose daily prn)   Refills:  0            Where to get your medicines      These medications were sent to [place], [place] - [address] [facility]  [address] [facility] [address]     Phone:  [phone]     acetaminophen 325 MG tablet    aspirin 325 MG EC tablet    hydrOXYzine 25 MG tablet    senna-docusate 8.6-50 MG per tablet         Some of these will need a paper prescription and others can be bought over the counter. Ask your nurse if you have questions.     Bring a paper prescription for each of these medications     traMADol 50 MG tablet                Protect others around you: Learn how to safely use, store and throw away your medicines at www.disposemymeds.org.             Medication List: This is a list of all your medications and when to take them. Check marks below indicate your daily home schedule. Keep this list as a reference.      Medications           Morning Afternoon Evening Bedtime As Needed    acetaminophen 325 MG tablet   Commonly known as:  TYLENOL   Take 3 tablets (975 mg) by mouth every 8 hours   Last time this was given:  975 mg on 9/28/2017 11:05 AM   Next Dose Due:  This evening                                aspirin 325 MG EC tablet   Take 1 tablet (325 mg) by mouth 2 times daily   Last time this was given:  325 mg on 9/28/2017  8:57 AM   Next Dose Due:  This evening                                      hydrOXYzine 25 MG tablet   Commonly  known as:  ATARAX   Take 1 tablet (25 mg) by mouth every 6 hours as needed for other (adjuvant pain)   Last time this was given:  25 mg on 9/27/2017  5:19 PM   Notes to Patient:  Also used for muscle cramps/spasms                                   LISINOPRIL PO   Take 40 mg by mouth daily   Last time this was given:  20 mg on 9/28/2017  8:58 AM   Next Dose Due:  friday                                   Multi-vitamin Tabs tablet   Take 1 tablet by mouth daily   Notes to Patient:  Home schedule                                OMEPRAZOLE PO   Take 20 mg by mouth every morning   Last time this was given:  20 mg on 9/27/2017  9:15 PM   Next Dose Due:  friday                                   senna-docusate 8.6-50 MG per tablet   Commonly known as:  SENOKOT-S;PERICOLACE   Take 1-2 tablets by mouth 2 times daily as needed for constipation   Last time this was given:  2 tablets on 9/28/2017  8:58 AM   Next Dose Due:  This evening                                      SIMVASTATIN PO   Take 20 mg by mouth At Bedtime   Last time this was given:  20 mg on 9/27/2017  9:16 PM   Next Dose Due:  Tonight at bedtime                                   SUMATRIPTAN SUCCINATE PO   Take 100 mg by mouth as needed for migraine (up to 1 dose daily prn)   Last time this was given:  100 mg on 9/27/2017  1:06 PM                                   traMADol 50 MG tablet   Commonly known as:  ULTRAM   1 tab po q 6 hrs prn pain scale 1-5,  2 tabs po q 6hrs prn pain scale 6-10   Last time this was given:  100 mg on 9/28/2017  9:00 AM   Next Dose Due:  300pm today                                             More Information        Constipation (Adult)  Constipation means that you have bowel movements that are less frequent than usual. Stools often become very hard and difficult to pass.  Constipation is very common. At some point in life it affects almost everyone. Since everyone's bowel habits are different, what is constipation to one person may not be  to another. Your healthcare provider may do tests to diagnose constipation. It depends on what he or she finds when evaluating you.    Symptoms of constipation include:    Abdominal pain    Bloating    Vomiting    Painful bowel movements    Itching, swelling, bleeding, or pain around the anus  Causes  Constipation can have many causes. These include:    Diet low in fiber    Too much dairy    Not drinking enough liquids    Lack of exercise or physical activity. This is especially true for older adults.    Changes in lifestyle or daily routine, including pregnancy, aging, work, and travel    Frequent use or misuse of laxatives    Ignoring the urge to have a bowel movement or delaying it until later    Medicines, such as certain prescription pain medicines, iron supplements, antacids, certain antidepressants, and calcium supplements    Diseases like irritable bowel syndrome, bowel obstructions, stroke, diabetes, thyroid disease, Parkinson disease, hemorrhoids, and colon cancer  Complications  Potential complications of constipation can include:    Hemorrhoids    Rectal bleeding from hemorrhoids or anal fissures (skin tears)    Hernias    Dependency on laxatives    Chronic constipation    Fecal impaction    Bowel obstruction or perforation  Home care  All treatment should be done after talking with your healthcare provider. This is especially true if you have another medical problems, are taking prescription medicines, or are an older adult. Treatment most often involves lifestyle changes. You may also need medicines. Your healthcare provider will tell you which will work best for you. Follow the advice below to help avoid this problem in the future.  Lifestyle changes  These lifestyle changes can help prevent constipation:    Diet. Eat a high-fiber diet, with fresh fruit and vegetables, and reduce dairy intake, meats, and processed foods    Fluids. It's important to get enough fluids each day. Drink plenty of water  when you eat more fiber. If you are on diet that limits the amount of fluid you can have, talk about this with your healthcare provider.    Regular exercise. Check with your healthcare provider first.  Medications  Take any medicines as directed. Some laxatives are safe to use only every now and then. Others can be taken on a regular basis. Talk with your doctor or pharmacist if you have questions.  Prescription pain medicines can cause constipation. If you are taking this kind of medicine, ask your healthcare provider if you should also take a stool softener.  Medicines you may take to treat constipation include:    Fiber supplements    Stool softeners    Laxatives    Enemas    Rectal suppositories  Follow-up care  Follow up with your healthcare provider if symptoms don't get better in the next few days. You may need to have more tests or see a specialist.  Call 911  Call 911 if any of these occur:    Trouble breathing    Stiff, rigid abdomen that is severely painful to touch    Confusion    Fainting or loss of consciousness    Rapid heart rate    Chest pain  When to seek medical advice  Call your healthcare provider right away if any of these occur:    Fever over 100.4 F (38 C)    Failure to resume normal bowel movements    Pain in your abdomen or back gets worse    Nausea or vomiting    Swelling in your abdomen    Blood in the stool    Black, tarry stool    Involuntary weight loss    Weakness  Date Last Reviewed: 12/30/2015 2000-2017 [employer]. [address] [place] [address], [place] [address]. All rights reserved. This information is not intended as a substitute for professional medical care. Always follow your healthcare professional's instructions.                  IT IS IMPORTANT TO CONTINUE TO USE YOUR INCENTIVE SPIROMETER WHILE AT HOME.    YOU will continue to be at Risk for Pneumonia following your surgery during your recovery, especially while on continued narcotics.   If you continue to take  narcotics at home or are experiencing at Temperature it is recommended to continue using .       Using an Incentive Spirometer    An incentive spirometer is a device that helps you do deep breathing exercises. These exercises expand your lungs, aid in circulation, and help prevent pneumonia. Deep breathing exercises also help you breathe better and improve the function of your lungs by:    Keeping your lungs clear    Strengthening your breathing muscles    Helping prevent respiratory complications or problems  The incentive spirometer gives you a way to take an active part in recover. A nurse or therapist will teach you breathing exercises. To do these exercises, you will breathe in through your mouth and not your nose. The incentive spirometer only works correctly if you breathe in through your mouth.  Steps to clear lungs  Step 1. Exhale normally. Then, inhale normally.    Relax and breathe out.  Step 2. Place your lips tightly around the mouthpiece.    Make sure the device is upright and not tilted.  Step 3. Inhale as much air as you can through the mouthpiece (don't breath through your nose).    Inhale slowly and deeply.    Hold your breath long enough to keep the balls or disk raised for at least 3 to 5 seconds, or as instructed by your healthcare provider.    Some spirometers have an indicator to let you know that you are breathing in too fast. If the indicator goes off, breathe in more slowly.  Step 4. Repeat the exercise regularly.    Do this exercise every hour while you're awake, or as instructed by your healthcare provider.    If you were taught deep breathing and coughing exercises, do them regularly as instructed by your healthcare provider.   Follow-up care  Make a follow up appointment, or as directed. Also, follow up with your healthcare provider as advised or if your symptoms do not improve or continue to get worse.  When to call your healthcare provider  Call your healthcare provider right away if  you have any of the following:    Fever 100.4  (38 C) or higher, or as directed by your healthcare provider    Brownish, bloody, or smelly sputum  Call 911  Call 911 if any of these occur:     Shortness of breath that doesn't get better after taking your medicine    Cool, moist, pale or blue skin    Trouble breathing or swallowing, wheezing    Fainting or loss of consciousness    Feeling of fizziness or weakness or a sudden drop in blood pressure    Feeling of doom or lightheaded    Chest pain or rapid heart rate  Date Last Reviewed: 1/1/2017 2000-2017 [employer]. [address] [place] [address]. All rights reserved. This information is not intended as a substitute for professional medical care. Always follow your healthcare professional's instructions.

## 2017-09-25 NOTE — OP NOTE
DATE OF PROCEDURE:  09/25/2017       PREOPERATIVE DIAGNOSIS:  Osteoarthritis, right knee.      POSTOPERATIVE DIAGNOSIS:  Osteoarthritis, right knee.      PROCEDURE:  Right total knee arthroplasty.      SURGEON:  Pedro Barnett MD      ASSISTANT:  Marialuisa Heard PA-C      ANESTHESIA:  General with adductor canal block.      ESTIMATED BLOOD LOSS:  25 mL.      TOURNIQUET TIME:  Approximately 60 minutes.      COMPLICATIONS:  None.      DESCRIPTION OF PROCEDURE:  Dilia Jones was taken to the operating room, where after successful administration of general anesthetic, antibiotic prophylaxis, tranexamic acid, adductor canal block and sterile prep and drape, the leg was exsanguinated and an anterior incision was made followed by a medial arthrotomy with a moderate anteromedial soft tissue release.  An intramedullary 5 degree guide was used with anterior referencing at 3 degrees of external rotation.  A box cut was made for PCL substitution.  Retractors were carefully placed, and an extramedullary guide was used to remove approximately 2-3 mm of bone and cartilage from the medial side.  Tibial rotation was matched to the femur.  Posterior osteophytes were removed under direct vision and with the knee in hyperflexion, carefully protecting the neurovascular structures.  A capsular injection was performed with similar protection.  6 mm was resected from the undersurface of a 19 mm patella and sized appropriately.  The proximal tibia was prepared.      After copious lavage and drying, Simplex cementing was used for a [employer] gender specific cruciate substituting size E femur, size 3 tibia, 12 mm polyethylene insert, and a 29 mm patellar button.  Excess cement was removed.  The cement was allowed to dry with the knee in full extension.  Range of motion, balance and tracking were all excellent.  Additional antibiotic and Betadine irrigation was performed followed by layered anatomic closure over a deep drain.  She  will receive 24 hours of antibiotic prophylaxis and 2-4 weeks of DVT prophylaxis.  There were no complications.         MILADIS BURKETT MD             D: 2017 08:36   T: 2017 11:02   MT: [id number]      Name:     CHARLES MULLER   MRN:      [id number]        Account:        [id number]   :      1945           Procedure Date: 2017      Document: [id number]

## 2017-09-25 NOTE — BRIEF OP NOTE
[facility] Brief Operative Note    Pre-operative diagnosis: DJD   Post-operative diagnosis same   Procedure: Procedure(s):  Right Total knee Arthroplasty   Surgeon Request Adductor Canal Block  - Wound Class: I-Clean   Surgeon(s): Surgeon(s) and Role:     * Pedro Barnett MD - Primary   Estimated blood loss: * No values recorded between 9/25/2017 12:00 AM and 9/25/2017  7:05 AM *    Specimens: * No specimens in log *   Findings: No anticipated complications

## 2017-09-25 NOTE — CONSULTS
[facility]  Hospitalist Consult Note  Name: Dilia Jones    MRN: [id number]  YOB: 1945    Age: 72 year old  Date of admission: 9/25/2017  Primary care provider: [facility]     Requesting Physician:  Paul Barnett MD  Reason for consult:  Post op after RTKA         Assessment and Plan:   Dilia Jones is a 72 year old female with history of hypertension, hypercholesterolemia, migraines, diverticular disease, cataracts, GERD, cholecystectomy, and left TKA.  She was admitted on 9/25/17 after elective right TKA.  Surgery was uncomplicated.  The hosptialist service was asked to consult to help manage hypertension and other medical problems in the post op period.    Problem list:    1.  Post op after Right TKA on 9/25/17.  Doing well. Pain control, therapy, and DVT prophylaxis per Dr. Barnett.    2.  Hypertension.  Resume Lisinopril tomorrow.  Check BMP in theam.     3.  GERD.  Resume PTA Prilosec.     4.  Hypercholesterolemia.  Resume PTA Zocor.    5.  Migraines.  Resume PTA Imitrex prn.    Full code  ASA and PCD's for DVT prophylaxis  Disposition:  Inpatient cares.  TKA pathway.  Home in 2 days, likely.               History of Present Illness:   Dilia Jones is a 72 year old female with history of hypertension, hypercholesterolemia, migraines, diverticular disease, cataracts, GERD, cholecystectomy, and left TKA.  She was admitted on 9/25/17 after elective right TKA.  Surgery was uncomplicated.  The hosptialist service was asked to consult to help manage hypertension and other medical problems in the post op period. Dilia is feeling well after surgery.  She is a little sleepy but pain is well controlled.  She is anticipating some nausea.              Past Medical History:     Past Medical History:   Diagnosis Date     Arthritis      Gastroesophageal reflux disease      Hypertension     No cardiologist     Other chronic pain     Joint pain for 5 years.     PONV  "(postoperative nausea and vomiting)              Past Surgical History:     Past Surgical History:   Procedure Laterality Date     ARTHROPLASTY KNEE Left 12/2/2015    Procedure: ARTHROPLASTY KNEE;  Surgeon: Pedro Barnett MD;  Location: [facility] OR     CHOLECYSTECTOMY       CYSTECTOMY PILONIDAL       EXCISE GANGLION WRIST Left                Social History:     Social History   Substance Use Topics     Smoking status: Never Smoker     Smokeless tobacco: Never Used     Alcohol use Yes      Comment: 1 drink weekly             Family History:   History reviewed. No pertinent family history.          Allergies:     Allergies   Allergen Reactions     Penicillins Rash             Medications:       sodium chloride (PF)  3 mL Intracatheter Q8H     ceFAZolin  1 g Intravenous Q8H     ferrous gluconate  324 mg Oral Daily     acetaminophen  975 mg Oral Q8H     gabapentin  100 mg Oral TID     senna-docusate  1-2 tablet Oral BID     [START ON 9/26/2017] celecoxib  200 mg Oral Daily     aspirin EC  325 mg Oral BID     oxyCODONE  10 mg Oral Q12H             Review of Systems:   A comprehensive greater than 10 system review of systems was carried out.  Pertinent positives and negatives are noted above.  Otherwise negative for contributory info.            Physical Exam:   Blood pressure 103/64, pulse 97, temperature 96.1  F (35.6  C), temperature source Oral, resp. rate 12, height 1.575 m (5' 2\"), weight 77.1 kg (170 lb), SpO2 99 %, not currently breastfeeding.  Exam:  GENERAL: Pleasant and cooperative. No acute distress.  EYES: Pupils equal and round. No scleral erythema or icterus.  ENT: External ears are normal without deformity. Posterior oropharynx is without erythem, swelling, or exudate.  NECK: Supple. No masses or swelling. No tenderness. Thyroid is normal without mass or tenderness.  CHEST: Clear to auscultation. Normal breath sounds. No retractions.   CV: Regular rate and rhythm. No JVD. Pulses normal.  ABDOMEN: Bowel " sounds present. No tenderness. No masses or hernia.  EXTREMETIES: No clubbing, cyanosis, or ischemia.  SKIN: Warm and dry to touch. No wounds or rashes.  NEUROLOGIC: Strength and sensation are normal. Deep tendon reflexes are normal. Cranial nerves are normal.             Data:      None

## 2017-09-25 NOTE — ANESTHESIA CARE TRANSFER NOTE
Patient: Dilia Jones    Procedure(s):  Right Total knee Arthroplasty   Surgeon Request Adductor Canal Block  - Wound Class: I-Clean    Diagnosis: DJD  Diagnosis Additional Information: No value filed.    Anesthesia Type:   General, Periph. Nerve Block for postop pain, ETT     Note:  Airway :Face Mask  Patient transferred to:PACU  Comments: Awake and alert, report to RN,      Vitals: (Last set prior to Anesthesia Care Transfer)    CRNA VITALS  9/25/2017 0820 - 9/25/2017 0859      9/25/2017             SpO2: 96 %    Resp Rate (observed): (!)  6                Electronically Signed By: JACEY Ortiz CRNA  September 25, 2017  8:59 AM

## 2017-09-25 NOTE — ANESTHESIA PROCEDURE NOTES
Peripheral nerve/Neuraxial procedure note : Saphenous  Pre-Procedure  Performed by PADMINI COREY  Referred by KWADWO  Location: pre-op    Procedure Times:9/25/2017 6:59 AM and 9/25/2017 7:09 AM  Pre-Anesthestic Checklist: patient identified, IV checked, site marked, risks and benefits discussed, informed consent, monitors and equipment checked, pre-op evaluation, at physician/surgeon's request and post-op pain management    Timeout  Correct Patient: Yes   Correct Procedure: Yes   Correct Site: Yes   Correct Laterality: Yes   Correct Position: Yes   Site Marked: Yes   .   Procedure Documentation    Diagnosis:RT TKR.    Procedure:    Saphenous.  Local skin infiltrated with 1 mL of 1% lidocaine.     Ultrasound used to identify targeted nerve, plexus, or vascular marker and placed a needle adjacent to it., Ultrasound was used to visualize the spread of the anesthetic in close proximity to the above stated nerve.   Patient Prep;sterile gloves, povidone-iodine 7.5% surgical scrub.  .  Needle: insulated Needle Gauge: 22.    Needle Length (Inches) 3.13  Insertion Method: Single Shot.       Assessment/Narrative  Paresthesias: No.  .  The placement was negative for: blood aspirated, painful injection and site bleeding.  Bolus given via needle..   Secured via.   Complications: none. Comments:  10cc each, 2lido w/ epi and 0.75marc.The surgeon has given a verbal order transferring care of this patient to me for the performance of a regional analgesia block for post-op pain control. It is requested of me because I am uniquely trained and qualified to perform this block and the surgeon is neither trained nor qualified to perform this procedure.

## 2017-09-25 NOTE — PROGRESS NOTES
09/25/17 1650   Quick Adds   Type of Visit Initial PT Evaluation   Living Environment   Lives With child(della), adult   Living Arrangements house   Home Accessibility bed and bath on same level;stairs to enter home   Number of Stairs to Enter Home 1   Number of Stairs Within Home 0   Living Environment Comment Pt states she lives with her daughter who will be able to assist following discharge home. She has one step into the home and has arranged her home so she is able to stay on the main floor.    Self-Care   Usual Activity Tolerance moderate   Current Activity Tolerance moderate   Equipment Currently Used at Home none  (She states she has walker, cane, and shower bench from L TKA)   Functional Level Prior   Ambulation 0-->independent   Transferring 0-->independent   Toileting 0-->independent   Bathing 0-->independent   Dressing 0-->independent   Eating 0-->independent   Communication 0-->understands/communicates without difficulty   Swallowing 0-->swallows foods/liquids without difficulty   Cognition 0 - no cognition issues reported   Fall history within last six months no   Which of the above functional risks had a recent onset or change? ambulation;transferring   Prior Functional Level Comment Pt states she was independent with mobility priorwith no AD.    General Information   Onset of Illness/Injury or Date of Surgery - Date 09/25/17   Referring Physician Dr. Pedro Barnett   Patient/Family Goals Statement to return home   Pertinent History of Current Problem (include personal factors and/or comorbidities that impact the POC) Pt is POD #0 following R EMMA. PMH includes hyperlipidemia, hypertension, diverticulosis, cataracts, and GERD   Precautions/Limitations fall precautions   Weight-Bearing Status - LLE full weight-bearing   Weight-Bearing Status - RLE weight-bearing as tolerated   General Observations Pt is pleasant and cooperative   Cognitive Status Examination   Orientation orientation to person, place and  "time   Level of Consciousness alert   Follows Commands and Answers Questions 100% of the time;able to follow multistep instructions   Personal Safety and Judgment intact   Memory intact   Pain Assessment   Patient Currently in Pain Yes, see Vital Sign flowsheet   Posture    Posture Forward head position;Protracted shoulders   Range of Motion (ROM)   ROM Comment Generally WFL, R knee 12-67 degrees   Strength   Strength Comments Generally WFL, DNT R knee   Bed Mobility   Bed Mobility Comments Pt requires min A for R LE   Transfer Skills   Transfer Comments Pt requires CGA for sit to/from stand   Gait   Gait Comments Pt ambulates 2ft with fww and CGA   Balance   Balance Comments Good seated, good/fair standing   Modality Interventions   Planned Modality Interventions Cryotherapy   General Therapy Interventions   Planned Therapy Interventions balance training;bed mobility training;gait training;neuromuscular re-education;ROM;strengthening;stretching;transfer training;home program guidelines;progressive activity/exercise   Clinical Impression   Criteria for Skilled Therapeutic Intervention yes, treatment indicated   PT Diagnosis Difficulty with gait   Influenced by the following impairments Pt presents with increased R knee pain, decreased R knee ROM and strength, impaired balance, decreased functional capacity   Functional limitations due to impairments Pt demonstrates increased difficulty with bed mobility, transfers, gait, and stairs   Clinical Presentation Stable/Uncomplicated   Clinical Presentation Rationale pt is medically stable   Clinical Decision Making (Complexity) Low complexity   Therapy Frequency` 2 times/day   Predicted Duration of Therapy Intervention (days/wks) 4 days   Anticipated Discharge Disposition Home with Outpatient Therapy   Risk & Benefits of therapy have been explained Yes   Patient, Family & other staff in agreement with plan of care Yes   [facility] AM-PAC TM \"6 Clicks\"   2016, " "Trustees of [facility], under license to [employer].  All rights reserved.   6 Clicks Short Forms Basic Mobility Inpatient Short Form   [facility] AM-PAC  \"6 Clicks\" V.2 Basic Mobility Inpatient Short Form   1. Turning from your back to your side while in a flat bed without using bedrails? 3 - A Little   2. Moving from lying on your back to sitting on the side of a flat bed without using bedrails? 3 - A Little   3. Moving to and from a bed to a chair (including a wheelchair)? 3 - A Little   4. Standing up from a chair using your arms (e.g., wheelchair, or bedside chair)? 3 - A Little   5. To walk in hospital room? 3 - A Little   6. Climbing 3-5 steps with a railing? 2 - A Lot   Basic Mobility Raw Score (Score out of 24.Lower scores equate to lower levels of function) 17   Total Evaluation Time   Total Evaluation Time (Minutes) 15     "

## 2017-09-25 NOTE — ANESTHESIA POSTPROCEDURE EVALUATION
Patient: Dilia Jones    Procedure(s):  Right Total knee Arthroplasty   Surgeon Request Adductor Canal Block  - Wound Class: I-Clean    Diagnosis:DJD  Diagnosis Additional Information: [facility] Brief Operative Note       Pre-operative diagnosis: DJD  Post-operative diagnosis same  Procedure: Procedure(s):  Right Total knee Arthroplasty   Surgeon Request Adductor Canal Block  - Wound Class: I-Clean  Surgeon(s): Surgeon(s) a, nd Role:     * Pedro Barnett MD - Primary  Estimated blood loss: * No values recorded between 9/25/2017 12:00 AM and 9/25/2017  7:05 AM *              Specimens: * No specimens in log *  Findings: No anticipated complications              Anesthesia Type:  General, Periph. Nerve Block for postop pain, ETT    Note:  Anesthesia Post Evaluation    Patient location during evaluation: PACU  Patient participation: Able to fully participate in evaluation  Level of consciousness: awake  Pain management: adequate  Airway patency: patent  Cardiovascular status: acceptable  Respiratory status: acceptable  Hydration status: acceptable  PONV: none     Anesthetic complications: None          Last vitals:  Vitals:    09/25/17 0905 09/25/17 0910 09/25/17 0911   BP:   130/82   Pulse:      Resp: 14 17 12   Temp:      SpO2: 99% (!) 78% 96%         Electronically Signed By: Mike Resendiz MD  September 25, 2017  9:24 AM

## 2017-09-25 NOTE — IP AVS SNAPSHOT
[facility]    [address]    [facility] [address]    Phone:  [phone]    Fax:  [phone]                                       After Visit Summary   9/25/2017    Dilia Jones    MRN: [id number]           After Visit Summary Signature Page     I have received my discharge instructions, and my questions have been answered. I have discussed any challenges I see with this plan with the nurse or doctor.    ..........................................................................................................................................  Patient/Patient Representative Signature      ..........................................................................................................................................  Patient Representative Print Name and Relationship to Patient    ..................................................               ................................................  Date                                            Time    ..........................................................................................................................................  Reviewed by Signature/Title    ...................................................              ..............................................  Date                                                            Time

## 2017-09-26 ENCOUNTER — APPOINTMENT (OUTPATIENT)
Dept: PHYSICAL THERAPY | Facility: CLINIC | Age: 72
DRG: 470 | End: 2017-09-26
Attending: ORTHOPAEDIC SURGERY
Payer: MEDICARE

## 2017-09-26 LAB
ANION GAP SERPL CALCULATED.3IONS-SCNC: 4 MMOL/L (ref 3–14)
BUN SERPL-MCNC: 16 MG/DL (ref 7–30)
CALCIUM SERPL-MCNC: 8.7 MG/DL (ref 8.5–10.1)
CHLORIDE SERPL-SCNC: 99 MMOL/L (ref 94–109)
CO2 SERPL-SCNC: 31 MMOL/L (ref 20–32)
CREAT SERPL-MCNC: 1.01 MG/DL (ref 0.52–1.04)
GFR SERPL CREATININE-BSD FRML MDRD: 54 ML/MIN/1.7M2
GLUCOSE SERPL-MCNC: 106 MG/DL (ref 70–99)
HGB BLD-MCNC: 10.7 G/DL (ref 11.7–15.7)
POTASSIUM SERPL-SCNC: 4.6 MMOL/L (ref 3.4–5.3)
SODIUM SERPL-SCNC: 134 MMOL/L (ref 133–144)

## 2017-09-26 PROCEDURE — 99207 ZZC NON-BILLABLE SERV PER CHARTING: CPT | Performed by: HOSPITALIST

## 2017-09-26 PROCEDURE — 80048 BASIC METABOLIC PNL TOTAL CA: CPT | Performed by: ORTHOPAEDIC SURGERY

## 2017-09-26 PROCEDURE — A9270 NON-COVERED ITEM OR SERVICE: HCPCS | Mod: GY | Performed by: INTERNAL MEDICINE

## 2017-09-26 PROCEDURE — 85018 HEMOGLOBIN: CPT | Performed by: ORTHOPAEDIC SURGERY

## 2017-09-26 PROCEDURE — A9270 NON-COVERED ITEM OR SERVICE: HCPCS | Mod: GY | Performed by: ORTHOPAEDIC SURGERY

## 2017-09-26 PROCEDURE — 25000132 ZZH RX MED GY IP 250 OP 250 PS 637: Mod: GY | Performed by: INTERNAL MEDICINE

## 2017-09-26 PROCEDURE — 12000000 ZZH R&B MED SURG/OB

## 2017-09-26 PROCEDURE — 25000132 ZZH RX MED GY IP 250 OP 250 PS 637: Mod: GY | Performed by: ORTHOPAEDIC SURGERY

## 2017-09-26 PROCEDURE — 40000193 ZZH STATISTIC PT WARD VISIT

## 2017-09-26 PROCEDURE — 36415 COLL VENOUS BLD VENIPUNCTURE: CPT | Performed by: ORTHOPAEDIC SURGERY

## 2017-09-26 PROCEDURE — 97116 GAIT TRAINING THERAPY: CPT | Mod: GP

## 2017-09-26 PROCEDURE — 97535 SELF CARE MNGMENT TRAINING: CPT | Mod: GO | Performed by: REHABILITATION PRACTITIONER

## 2017-09-26 PROCEDURE — 25000132 ZZH RX MED GY IP 250 OP 250 PS 637: Mod: GY | Performed by: PHYSICIAN ASSISTANT

## 2017-09-26 PROCEDURE — 97165 OT EVAL LOW COMPLEX 30 MIN: CPT | Mod: GO | Performed by: REHABILITATION PRACTITIONER

## 2017-09-26 PROCEDURE — 97530 THERAPEUTIC ACTIVITIES: CPT | Mod: GP

## 2017-09-26 PROCEDURE — 97110 THERAPEUTIC EXERCISES: CPT | Mod: GP

## 2017-09-26 PROCEDURE — A9270 NON-COVERED ITEM OR SERVICE: HCPCS | Mod: GY | Performed by: PHYSICIAN ASSISTANT

## 2017-09-26 PROCEDURE — 25000125 ZZHC RX 250: Performed by: ORTHOPAEDIC SURGERY

## 2017-09-26 PROCEDURE — 40000133 ZZH STATISTIC OT WARD VISIT: Performed by: REHABILITATION PRACTITIONER

## 2017-09-26 RX ORDER — LISINOPRIL 20 MG/1
20 TABLET ORAL DAILY
Status: DISCONTINUED | OUTPATIENT
Start: 2017-09-27 | End: 2017-09-28 | Stop reason: HOSPADM

## 2017-09-26 RX ADMIN — OMEPRAZOLE 20 MG: 20 CAPSULE, DELAYED RELEASE ORAL at 08:33

## 2017-09-26 RX ADMIN — ASPIRIN 325 MG: 325 TABLET, DELAYED RELEASE ORAL at 08:33

## 2017-09-26 RX ADMIN — GABAPENTIN 100 MG: 100 CAPSULE ORAL at 13:42

## 2017-09-26 RX ADMIN — ACETAMINOPHEN 975 MG: 325 TABLET, FILM COATED ORAL at 08:33

## 2017-09-26 RX ADMIN — GABAPENTIN 100 MG: 100 CAPSULE ORAL at 08:33

## 2017-09-26 RX ADMIN — ONDANSETRON 4 MG: 4 TABLET, ORALLY DISINTEGRATING ORAL at 09:30

## 2017-09-26 RX ADMIN — ACETAMINOPHEN 975 MG: 325 TABLET, FILM COATED ORAL at 16:17

## 2017-09-26 RX ADMIN — OXYCODONE HYDROCHLORIDE 10 MG: 10 TABLET, FILM COATED, EXTENDED RELEASE ORAL at 18:47

## 2017-09-26 RX ADMIN — OMEPRAZOLE 20 MG: 20 CAPSULE, DELAYED RELEASE ORAL at 20:29

## 2017-09-26 RX ADMIN — OXYCODONE HYDROCHLORIDE 5 MG: 5 TABLET ORAL at 18:04

## 2017-09-26 RX ADMIN — TRAMADOL HYDROCHLORIDE 100 MG: 50 TABLET, COATED ORAL at 01:01

## 2017-09-26 RX ADMIN — ASPIRIN 325 MG: 325 TABLET, DELAYED RELEASE ORAL at 20:29

## 2017-09-26 RX ADMIN — SIMVASTATIN 20 MG: 20 TABLET, FILM COATED ORAL at 20:29

## 2017-09-26 RX ADMIN — OXYCODONE HYDROCHLORIDE 5 MG: 5 TABLET ORAL at 08:33

## 2017-09-26 RX ADMIN — GABAPENTIN 100 MG: 100 CAPSULE ORAL at 20:29

## 2017-09-26 RX ADMIN — OXYCODONE HYDROCHLORIDE 10 MG: 10 TABLET, FILM COATED, EXTENDED RELEASE ORAL at 06:56

## 2017-09-26 RX ADMIN — OXYCODONE HYDROCHLORIDE 5 MG: 5 TABLET ORAL at 13:45

## 2017-09-26 RX ADMIN — SENNOSIDES AND DOCUSATE SODIUM 2 TABLET: 8.6; 5 TABLET ORAL at 08:34

## 2017-09-26 RX ADMIN — CELECOXIB 200 MG: 200 CAPSULE ORAL at 08:33

## 2017-09-26 RX ADMIN — SENNOSIDES AND DOCUSATE SODIUM 1 TABLET: 8.6; 5 TABLET ORAL at 20:29

## 2017-09-26 RX ADMIN — FERROUS GLUCONATE 324 MG: 324 TABLET ORAL at 08:33

## 2017-09-26 ASSESSMENT — PAIN DESCRIPTION - DESCRIPTORS
DESCRIPTORS: ACHING
DESCRIPTORS: ACHING;TIGHTNESS
DESCRIPTORS: ACHING;TIGHTNESS

## 2017-09-26 NOTE — PLAN OF CARE
Problem: Knee Replacement, Total (Adult)  Goal: Signs and Symptoms of Listed Potential Problems Will be Absent or Manageable (Knee Replacement, Total)  Signs and symptoms of listed potential problems will be absent or manageable by discharge/transition of care (reference Knee Replacement, Total (Adult) CPG).   Outcome: Improving  A&O x4. VSS. LS CTA all fields. BS active x4, no flatus. ACE to RLE is CDI, CMS intact, baseline numbness to bilateral hands. Hemovac removed this shift. medhat PO well. up with A1 and walker. PO tramadol and scheduled oxycontin managing pain. voiding in good amts, edwards removed this AM, is due to void. Will continue to monitor.

## 2017-09-26 NOTE — PROGRESS NOTES
09/26/17 1146   Quick Adds   Type of Visit Initial Occupational Therapy Evaluation   Living Environment   Lives With child(della), adult   Living Arrangements house   Home Accessibility bed and bath are not on the first floor;tub/shower is not walk in;stairs within home   Number of Stairs to Enter Home 1   Number of Stairs Within Home 15   Transportation Available car;family or friend will provide   Living Environment Comment Pt states she lives with her daughter who will be able to assist following discharge home. She has one step into the home and has arranged her home so she is able to stay on the main floor. Tub/shower on upper level   Self-Care   Usual Activity Tolerance moderate   Current Activity Tolerance fair   Equipment Currently Used at Home (Pt. has walker, cane, RTSw/arms and shower bench from L TKA)   Functional Level Prior   Ambulation 0-->independent   Transferring 0-->independent   Toileting 0-->independent   Bathing 0-->independent   Dressing 0-->independent   Eating 0-->independent   Communication 0-->understands/communicates without difficulty   Swallowing 0-->swallows foods/liquids without difficulty   Cognition 0 - no cognition issues reported   Fall history within last six months no   Which of the above functional risks had a recent onset or change? ambulation;transferring;toileting;bathing;dressing   General Information   Onset of Illness/Injury or Date of Surgery - Date 09/25/17   Referring Physician Dr. Barnett   Patient/Family Goals Statement to return home   Additional Occupational Profile Info/Pertinent History of Current Problem Patient is POD #1 for R TKA, had L TKA on 12/15   Precautions/Limitations fall precautions   Weight-Bearing Status - RLE weight-bearing as tolerated   Cognitive Status Examination   Orientation orientation to person, place and time   Level of Consciousness alert   Able to Follow Commands WNL/WFL   Personal Safety (Cognitive) WNL/WFL   Memory intact    Organization/Problem Solving No deficits were identified   Sensory Examination   Sensory Quick Adds No deficits were identified   Pain Assessment   Patient Currently in Pain Yes, see Vital Sign flowsheet  (rating pain 6/10)   Integumentary/Edema   Integumentary/Edema no deficits were identifed   Posture   Posture not impaired   Range of Motion (ROM)   ROM Quick Adds No deficits were identified   Strength   Manual Muscle Testing Quick Adds No deficits were identified   Hand Strength   Hand Strength Comments intact   Muscle Tone Assessment   Muscle Tone Quick Adds No deficits were identified   Coordination   Upper Extremity Coordination No deficits were identified   Mobility   Bed Mobility Comments SBA supine<>sit EOB   Transfer Skills   Transfer Comments Pt declined transfer bed>chair due to fatigue   Lower Body Dressing   Level of [place]: Dress Lower Body (Treatment initiated, refer to OT daily note for details)   Eating/Self Feeding   Level of [place]: Eating independent   Instrumental Activities of Daily Living (IADL)   IADL Comments daughter to complete as needed, sister and son, DIL to A with driving   Activities of Daily Living Analysis   Impairments Contributing to Impaired Activities of Daily Living balance impaired;pain;ROM decreased;strength decreased   General Therapy Interventions   Planned Therapy Interventions ADL retraining;progressive activity/exercise;transfer training   Clinical Impression   Criteria for Skilled Therapeutic Interventions Met yes, treatment indicated   OT Diagnosis decreased ADL's   Influenced by the following impairments balance impaired;pain;ROM decreased;strength decreased   Assessment of Occupational Performance 5 or more Performance Deficits   Identified Performance Deficits decreased ADLs and IADLs,: dressing, bathing, toileting, grooming/hygiene, cooking, driving, household chores, shopping   Clinical Decision Making (Complexity) Low complexity   Therapy Frequency  "daily   Predicted Duration of Therapy Intervention (days/wks) 3 days   Anticipated Discharge Disposition Home   Risks and Benefits of Treatment have been explained. Yes   Patient, Family & other staff in agreement with plan of care Yes   [facility] TM \"6 Clicks\"   2016, Trustees of [facility], under license to [employer].  All rights reserved.   6 Clicks Short Forms Daily Activity Inpatient Short Form   [facility]  \"6 Clicks\" Daily Activity Inpatient Short Form   1. Putting on and taking off regular lower body clothing? 3 - A Little   2. Bathing (including washing, rinsing, drying)? 3 - A Little   3. Toileting, which includes using toilet, bedpan or urinal? 3 - A Little   4. Putting on and taking off regular upper body clothing? 4 - None   5. Taking care of personal grooming such as brushing teeth? 3 - A Little   6. Eating meals? 4 - None   Daily Activity Raw Score (Score out of 24.Lower scores equate to lower levels of function) 20   Total Evaluation Time   Total Evaluation Time (Minutes) 10      09/26/17 1146   Quick Adds   Type of Visit Initial Occupational Therapy Evaluation   Living Environment   Lives With child(della), adult   Living Arrangements house   Home Accessibility bed and bath are not on the first floor;tub/shower is not walk in;stairs within home   Number of Stairs to Enter Home 1   Number of Stairs Within Home 15   Transportation Available car;family or friend will provide   Living Environment Comment Pt states she lives with her daughter who will be able to assist following discharge home. She has one step into the home and has arranged her home so she is able to stay on the main floor. Tub/shower on upper level   Self-Care   Usual Activity Tolerance moderate   Current Activity Tolerance fair   Equipment Currently Used at Home (Pt. has walker, cane, RTSw/arms and shower bench from L TKA)   Functional Level Prior   Ambulation 0-->independent   Transferring " 0-->independent   Toileting 0-->independent   Bathing 0-->independent   Dressing 0-->independent   Eating 0-->independent   Communication 0-->understands/communicates without difficulty   Swallowing 0-->swallows foods/liquids without difficulty   Cognition 0 - no cognition issues reported   Fall history within last six months no   Which of the above functional risks had a recent onset or change? ambulation;transferring;toileting;bathing;dressing   General Information   Onset of Illness/Injury or Date of Surgery - Date 09/25/17   Referring Physician Dr. Barnett   Patient/Family Goals Statement to return home   Additional Occupational Profile Info/Pertinent History of Current Problem Patient is POD #1 for R TKA, had L TKA on 12/15   Precautions/Limitations fall precautions   Weight-Bearing Status - RLE weight-bearing as tolerated   Cognitive Status Examination   Orientation orientation to person, place and time   Level of Consciousness alert   Able to Follow Commands WNL/WFL   Personal Safety (Cognitive) WNL/WFL   Memory intact   Organization/Problem Solving No deficits were identified   Sensory Examination   Sensory Quick Adds No deficits were identified   Pain Assessment   Patient Currently in Pain Yes, see Vital Sign flowsheet  (rating pain 6/10)   Integumentary/Edema   Integumentary/Edema no deficits were identifed   Posture   Posture not impaired   Range of Motion (ROM)   ROM Quick Adds No deficits were identified   Strength   Manual Muscle Testing Quick Adds No deficits were identified   Hand Strength   Hand Strength Comments intact   Muscle Tone Assessment   Muscle Tone Quick Adds No deficits were identified   Coordination   Upper Extremity Coordination No deficits were identified   Mobility   Bed Mobility Comments SBA supine<>sit EOB   Transfer Skills   Transfer Comments Pt declined transfer bed>chair due to fatigue   Lower Body Dressing   Level of [place]: Dress Lower Body (Treatment initiated, refer to OT  "daily note for details)   Eating/Self Feeding   Level of [place]: Eating independent   Instrumental Activities of Daily Living (IADL)   IADL Comments daughter to complete as needed, sister and son, DIL to A with driving   Activities of Daily Living Analysis   Impairments Contributing to Impaired Activities of Daily Living balance impaired;pain;ROM decreased;strength decreased   General Therapy Interventions   Planned Therapy Interventions ADL retraining;progressive activity/exercise;transfer training   Clinical Impression   Criteria for Skilled Therapeutic Interventions Met yes, treatment indicated   OT Diagnosis decreased ADL's   Influenced by the following impairments balance impaired;pain;ROM decreased;strength decreased   Assessment of Occupational Performance 5 or more Performance Deficits   Identified Performance Deficits decreased ADLs and IADLs,: dressing, bathing, toileting, grooming/hygiene, cooking, driving, household chores, shopping   Clinical Decision Making (Complexity) Low complexity   Therapy Frequency daily   Predicted Duration of Therapy Intervention (days/wks) 3 days   Anticipated Discharge Disposition Home   Risks and Benefits of Treatment have been explained. Yes   Patient, Family & other staff in agreement with plan of care Yes   [facility] \"6 Clicks\"   2016, Trustees of [facility], under license to [employer].  All rights reserved.   6 Clicks Short Forms Daily Activity Inpatient Short Form   [facility]  \"6 Clicks\" Daily Activity Inpatient Short Form   1. Putting on and taking off regular lower body clothing? 3 - A Little   2. Bathing (including washing, rinsing, drying)? 3 - A Little   3. Toileting, which includes using toilet, bedpan or urinal? 3 - A Little   4. Putting on and taking off regular upper body clothing? 4 - None   5. Taking care of personal grooming such as brushing teeth? 3 - A Little   6. Eating meals? 4 - None   Daily Activity Raw " Score (Score out of 24.Lower scores equate to lower levels of function) 20   Total Evaluation Time   Total Evaluation Time (Minutes) 10      09/26/17 1146   Quick Adds   Type of Visit Initial Occupational Therapy Evaluation   Living Environment   Lives With child(della), adult   Living Arrangements house   Home Accessibility bed and bath are not on the first floor;tub/shower is not walk in;stairs within home   Number of Stairs to Enter Home 1   Number of Stairs Within Home 15   Transportation Available car;family or friend will provide   Living Environment Comment Pt states she lives with her daughter who will be able to assist following discharge home. She has one step into the home and has arranged her home so she is able to stay on the main floor. Tub/shower on upper level   Self-Care   Usual Activity Tolerance moderate   Current Activity Tolerance fair   Equipment Currently Used at Home (Pt. has walker, cane, RTSw/arms and shower bench from L TKA)   Functional Level Prior   Ambulation 0-->independent   Transferring 0-->independent   Toileting 0-->independent   Bathing 0-->independent   Dressing 0-->independent   Eating 0-->independent   Communication 0-->understands/communicates without difficulty   Swallowing 0-->swallows foods/liquids without difficulty   Cognition 0 - no cognition issues reported   Fall history within last six months no   Which of the above functional risks had a recent onset or change? ambulation;transferring;toileting;bathing;dressing   General Information   Onset of Illness/Injury or Date of Surgery - Date 09/25/17   Referring Physician Dr. Barnett   Patient/Family Goals Statement to return home   Additional Occupational Profile Info/Pertinent History of Current Problem Patient is POD #1 for R TKA, had L TKA on 12/15   Precautions/Limitations fall precautions   Weight-Bearing Status - RLE weight-bearing as tolerated   Cognitive Status Examination   Orientation orientation to person, place and  time   Level of Consciousness alert   Able to Follow Commands WNL/WFL   Personal Safety (Cognitive) WNL/WFL   Memory intact   Organization/Problem Solving No deficits were identified   Sensory Examination   Sensory Quick Adds No deficits were identified   Pain Assessment   Patient Currently in Pain Yes, see Vital Sign flowsheet  (rating pain 6/10)   Integumentary/Edema   Integumentary/Edema no deficits were identifed   Posture   Posture not impaired   Range of Motion (ROM)   ROM Quick Adds No deficits were identified   Strength   Manual Muscle Testing Quick Adds No deficits were identified   Hand Strength   Hand Strength Comments intact   Muscle Tone Assessment   Muscle Tone Quick Adds No deficits were identified   Coordination   Upper Extremity Coordination No deficits were identified   Mobility   Bed Mobility Comments SBA supine<>sit EOB   Transfer Skills   Transfer Comments Pt declined transfer bed>chair due to fatigue   Lower Body Dressing   Level of [place]: Dress Lower Body (Treatment initiated, refer to OT daily note for details)   Eating/Self Feeding   Level of [place]: Eating independent   Instrumental Activities of Daily Living (IADL)   IADL Comments daughter to complete as needed, sister and son, DIL to A with driving   Activities of Daily Living Analysis   Impairments Contributing to Impaired Activities of Daily Living balance impaired;pain;ROM decreased;strength decreased   General Therapy Interventions   Planned Therapy Interventions ADL retraining;progressive activity/exercise;transfer training   Clinical Impression   Criteria for Skilled Therapeutic Interventions Met yes, treatment indicated   OT Diagnosis decreased ADL's   Influenced by the following impairments balance impaired;pain;ROM decreased;strength decreased   Assessment of Occupational Performance 5 or more Performance Deficits   Identified Performance Deficits decreased ADLs and IADLs,: dressing, bathing, toileting,  "grooming/hygiene, cooking, driving, household chores, shopping   Clinical Decision Making (Complexity) Low complexity   Therapy Frequency daily   Predicted Duration of Therapy Intervention (days/wks) 3 days   Anticipated Discharge Disposition Home   Risks and Benefits of Treatment have been explained. Yes   Patient, Family & other staff in agreement with plan of care Yes   [facility] TM \"6 Clicks\"   2016, Trustees of [facility], under license to [employer].  All rights reserved.   6 Clicks Short Forms Daily Activity Inpatient Short Form   [facility]  \"6 Clicks\" Daily Activity Inpatient Short Form   1. Putting on and taking off regular lower body clothing? 3 - A Little   2. Bathing (including washing, rinsing, drying)? 3 - A Little   3. Toileting, which includes using toilet, bedpan or urinal? 3 - A Little   4. Putting on and taking off regular upper body clothing? 4 - None   5. Taking care of personal grooming such as brushing teeth? 3 - A Little   6. Eating meals? 4 - None   Daily Activity Raw Score (Score out of 24.Lower scores equate to lower levels of function) 20   Total Evaluation Time   Total Evaluation Time (Minutes) 10     "

## 2017-09-26 NOTE — PROGRESS NOTES
"Orthopedic Surgery  9/26/2017  POD 1    S: Patient voices no unexpected ortho complaints today. Denies chest pain or shortness of breath. \"I don't remember this much pain last time.\"    O: Blood pressure 120/70, pulse 63, temperature 97.6  F (36.4  C), temperature source Oral, resp. rate 16, height 1.575 m (5' 2\"), weight 77.1 kg (170 lb), SpO2 98 %, not currently breastfeeding.  Lab Results   Component Value Date    HGB 11.5 12/04/2015     No results found for: INR  I/O last 3 completed shifts:  In: 2000 [I.V.:2000]  Out: 855 [Urine:725; Drains:105; Blood:25]  Distal extremity CMSI bilaterally.  Calves are negative bilaterally, both soft and nontender.  The dressing is C/D/I.      A: Ms. Jones is doing well status post Procedure(s):  ARTHROPLASTY KNEE.    P: Continue physical therapy. Continue DVT pphx with ASA due to high mobility and low risk factors for DVT. Anticipate discharge to home on POD 2 or 3 depending on PT and pain control. Vistaril available prn.    Marialuisa Heard PA-C  [phone]  "

## 2017-09-26 NOTE — PLAN OF CARE
Problem: Patient Care Overview  Goal: Plan of Care/Patient Progress Review  Outcome: Improving  Neuro: alert and orientated. Calm, cooperative.  CMS to right knee WDL   VS:  T: 97.8 HR:  64 BP: 112/58  Resp:  16  O2: 92% rm air.  Tele: NA  LS: Clear.   GI: Bowel sound active. Passing flatus. No BM since surgery. Pt c/o nausea this morning given PRN PO zofran x 1 with relief.   : Bejarano removed this am. Pt able to Void x 1 this shift. Continue to monitor   Skin: Incision to right knee covered by ace wrap - cms intact.   Pain: pain controled po scheduled OxyContin, PO PRN oxy, PO scheduled tylenol.   Transfer: A 1 with walker and gait belt.   IV:  PIV SL WDL  Diet:  Regular.   Plan: TBD. PT,OT, Ortho and hospitalist following

## 2017-09-26 NOTE — PLAN OF CARE
Problem: Patient Care Overview  Goal: Plan of Care/Patient Progress Review  OT eval completed, treatment initiated. Patient in POD#1 for R TKA, per PT KI is not needed. Patient lives at home with her daughter, bedroom and bathroom are upstairs. She has following AE from previous L TKA nearly 2 years ago: raised toilet seat w/ arm rests, shower chair.     Discharge Planner OT   Patient plan for discharge: Home with daughter assist  Current status: SBA with bed mobility. After review, pt was SBA to don/doff pants and socks. Declined OOB activity due to fatigue.   Barriers to return to prior living situation: stairs- defer to PT, fatigue  Recommendations for discharge: TBD POD#2  Rationale for recommendations: TBD POD#2       Entered by: Maira Davies 09/26/2017 2:19 PM

## 2017-09-26 NOTE — PLAN OF CARE
Problem: Patient Care Overview  Goal: Plan of Care/Patient Progress Review  Discharge Planner PT      Patient plan for discharge: Pt states home  Current status: Pt requires CGA for bed mobility, CGA for sit to/from stand, and ambulates 15ft with fww and CGA. SLR is independent, no KI needed. R knee ROM 12-88. Pt reporting nausea with transitions and following gait.   Barriers to return to prior living situation: step to enter, level of assist, falls risk   Recommendations for discharge: TBD POD #2  Rationale for recommendations: TBD POD #2       Entered by: Liset Briggs 09/26/2017 9:26 AM

## 2017-09-26 NOTE — PROGRESS NOTES
Emilie OSULLIVAN  VS monitored, 2L NC, lethargic but awakens spontaneously or to sternal rub and voice, drsg C/D/I, CMS+, up w/A1 and ww, scop patch L ear, Tramadol/Tylenol for pain, edwards and hemovac patent, ate/drank little, not much of an appetite, plan is home w/dtr upon d/c, will cont to monitor.

## 2017-09-26 NOTE — PROGRESS NOTES
Chart check    Consult for post op medical management    Pt appears to be doing well, no medical problems evident-BP meds resumed with parameters    Nothing to add today from my side. Cont to monitor. Disposition per ortho

## 2017-09-27 ENCOUNTER — APPOINTMENT (OUTPATIENT)
Dept: PHYSICAL THERAPY | Facility: CLINIC | Age: 72
DRG: 470 | End: 2017-09-27
Attending: ORTHOPAEDIC SURGERY
Payer: MEDICARE

## 2017-09-27 LAB
ANION GAP SERPL CALCULATED.3IONS-SCNC: 5 MMOL/L (ref 3–14)
BUN SERPL-MCNC: 19 MG/DL (ref 7–30)
CALCIUM SERPL-MCNC: 8.4 MG/DL (ref 8.5–10.1)
CHLORIDE SERPL-SCNC: 94 MMOL/L (ref 94–109)
CO2 SERPL-SCNC: 28 MMOL/L (ref 20–32)
CREAT SERPL-MCNC: 0.98 MG/DL (ref 0.52–1.04)
ERYTHROCYTE [DISTWIDTH] IN BLOOD BY AUTOMATED COUNT: 11.9 % (ref 10–15)
GFR SERPL CREATININE-BSD FRML MDRD: 56 ML/MIN/1.7M2
GLUCOSE SERPL-MCNC: 107 MG/DL (ref 70–99)
HCT VFR BLD AUTO: 30.8 % (ref 35–47)
HGB BLD-MCNC: 10.3 G/DL (ref 11.7–15.7)
MCH RBC QN AUTO: 32.2 PG (ref 26.5–33)
MCHC RBC AUTO-ENTMCNC: 33.4 G/DL (ref 31.5–36.5)
MCV RBC AUTO: 96 FL (ref 78–100)
OSMOLALITY SERPL: 269 MMOL/KG (ref 280–301)
OSMOLALITY UR: 631 MMOL/KG (ref 100–1200)
PLATELET # BLD AUTO: 180 10E9/L (ref 150–450)
POTASSIUM SERPL-SCNC: 4.7 MMOL/L (ref 3.4–5.3)
RBC # BLD AUTO: 3.2 10E12/L (ref 3.8–5.2)
SODIUM SERPL-SCNC: 125 MMOL/L (ref 133–144)
SODIUM SERPL-SCNC: 127 MMOL/L (ref 133–144)
SODIUM SERPL-SCNC: 128 MMOL/L (ref 133–144)
SODIUM UR-SCNC: 17 MMOL/L
WBC # BLD AUTO: 6.3 10E9/L (ref 4–11)

## 2017-09-27 PROCEDURE — 83935 ASSAY OF URINE OSMOLALITY: CPT | Performed by: HOSPITALIST

## 2017-09-27 PROCEDURE — 25000132 ZZH RX MED GY IP 250 OP 250 PS 637: Mod: GY | Performed by: PHYSICIAN ASSISTANT

## 2017-09-27 PROCEDURE — 12000000 ZZH R&B MED SURG/OB

## 2017-09-27 PROCEDURE — 84295 ASSAY OF SERUM SODIUM: CPT | Performed by: HOSPITALIST

## 2017-09-27 PROCEDURE — A9270 NON-COVERED ITEM OR SERVICE: HCPCS | Mod: GY | Performed by: INTERNAL MEDICINE

## 2017-09-27 PROCEDURE — 36415 COLL VENOUS BLD VENIPUNCTURE: CPT | Performed by: HOSPITALIST

## 2017-09-27 PROCEDURE — 97110 THERAPEUTIC EXERCISES: CPT | Mod: GP

## 2017-09-27 PROCEDURE — 85027 COMPLETE CBC AUTOMATED: CPT | Performed by: HOSPITALIST

## 2017-09-27 PROCEDURE — 40000193 ZZH STATISTIC PT WARD VISIT

## 2017-09-27 PROCEDURE — A9270 NON-COVERED ITEM OR SERVICE: HCPCS | Mod: GY | Performed by: PHYSICIAN ASSISTANT

## 2017-09-27 PROCEDURE — 25000125 ZZHC RX 250: Performed by: ORTHOPAEDIC SURGERY

## 2017-09-27 PROCEDURE — 80048 BASIC METABOLIC PNL TOTAL CA: CPT | Performed by: HOSPITALIST

## 2017-09-27 PROCEDURE — 83930 ASSAY OF BLOOD OSMOLALITY: CPT | Performed by: HOSPITALIST

## 2017-09-27 PROCEDURE — 99207 ZZC CDG-MDM COMPONENT: MEETS LOW - DOWN CODED: CPT | Performed by: HOSPITALIST

## 2017-09-27 PROCEDURE — 99232 SBSQ HOSP IP/OBS MODERATE 35: CPT | Performed by: HOSPITALIST

## 2017-09-27 PROCEDURE — A9270 NON-COVERED ITEM OR SERVICE: HCPCS | Mod: GY | Performed by: ORTHOPAEDIC SURGERY

## 2017-09-27 PROCEDURE — 25000132 ZZH RX MED GY IP 250 OP 250 PS 637: Mod: GY | Performed by: ORTHOPAEDIC SURGERY

## 2017-09-27 PROCEDURE — 97116 GAIT TRAINING THERAPY: CPT | Mod: GP

## 2017-09-27 PROCEDURE — 25000132 ZZH RX MED GY IP 250 OP 250 PS 637: Mod: GY | Performed by: INTERNAL MEDICINE

## 2017-09-27 PROCEDURE — 84300 ASSAY OF URINE SODIUM: CPT | Performed by: HOSPITALIST

## 2017-09-27 RX ORDER — HYDROXYZINE HYDROCHLORIDE 25 MG/1
25 TABLET, FILM COATED ORAL EVERY 6 HOURS PRN
Qty: 60 TABLET | Refills: 0 | Status: SHIPPED | OUTPATIENT
Start: 2017-09-27

## 2017-09-27 RX ORDER — ACETAMINOPHEN 325 MG/1
975 TABLET ORAL EVERY 8 HOURS
Qty: 200 TABLET | Refills: 1 | Status: SHIPPED | OUTPATIENT
Start: 2017-09-27

## 2017-09-27 RX ORDER — ASPIRIN 325 MG
325 TABLET, DELAYED RELEASE (ENTERIC COATED) ORAL 2 TIMES DAILY
Qty: 60 TABLET | Refills: 0 | Status: SHIPPED | OUTPATIENT
Start: 2017-09-27 | End: 2017-10-27

## 2017-09-27 RX ORDER — LORAZEPAM 2 MG/ML
.5-1 INJECTION INTRAMUSCULAR EVERY 4 HOURS PRN
Status: DISCONTINUED | OUTPATIENT
Start: 2017-09-27 | End: 2017-09-28 | Stop reason: HOSPADM

## 2017-09-27 RX ORDER — TRAMADOL HYDROCHLORIDE 50 MG/1
TABLET ORAL
Qty: 65 TABLET | Refills: 0 | Status: SHIPPED | OUTPATIENT
Start: 2017-09-27

## 2017-09-27 RX ORDER — AMOXICILLIN 250 MG
1-2 CAPSULE ORAL 2 TIMES DAILY PRN
Qty: 60 TABLET | Refills: 0 | Status: SHIPPED | OUTPATIENT
Start: 2017-09-27

## 2017-09-27 RX ORDER — METOCLOPRAMIDE HYDROCHLORIDE 5 MG/ML
5 INJECTION INTRAMUSCULAR; INTRAVENOUS EVERY 6 HOURS PRN
Status: DISCONTINUED | OUTPATIENT
Start: 2017-09-27 | End: 2017-09-28 | Stop reason: HOSPADM

## 2017-09-27 RX ADMIN — HYDROXYZINE HYDROCHLORIDE 25 MG: 25 TABLET ORAL at 17:19

## 2017-09-27 RX ADMIN — ONDANSETRON 4 MG: 4 TABLET, ORALLY DISINTEGRATING ORAL at 08:52

## 2017-09-27 RX ADMIN — OMEPRAZOLE 20 MG: 20 CAPSULE, DELAYED RELEASE ORAL at 21:15

## 2017-09-27 RX ADMIN — TRAMADOL HYDROCHLORIDE 100 MG: 50 TABLET, COATED ORAL at 06:20

## 2017-09-27 RX ADMIN — PROCHLORPERAZINE MALEATE 5 MG: 5 TABLET, FILM COATED ORAL at 10:23

## 2017-09-27 RX ADMIN — GABAPENTIN 100 MG: 100 CAPSULE ORAL at 21:16

## 2017-09-27 RX ADMIN — SENNOSIDES AND DOCUSATE SODIUM 1 TABLET: 8.6; 5 TABLET ORAL at 21:15

## 2017-09-27 RX ADMIN — ACETAMINOPHEN 975 MG: 325 TABLET, FILM COATED ORAL at 17:19

## 2017-09-27 RX ADMIN — SIMVASTATIN 20 MG: 20 TABLET, FILM COATED ORAL at 21:16

## 2017-09-27 RX ADMIN — SUMATRIPTAN 100 MG: 50 TABLET, FILM COATED ORAL at 13:06

## 2017-09-27 RX ADMIN — ASPIRIN 325 MG: 325 TABLET, DELAYED RELEASE ORAL at 21:16

## 2017-09-27 RX ADMIN — ACETAMINOPHEN 975 MG: 325 TABLET, FILM COATED ORAL at 00:19

## 2017-09-27 RX ADMIN — TRAMADOL HYDROCHLORIDE 100 MG: 50 TABLET, COATED ORAL at 18:58

## 2017-09-27 RX ADMIN — TRAMADOL HYDROCHLORIDE 50 MG: 50 TABLET, COATED ORAL at 13:09

## 2017-09-27 ASSESSMENT — PAIN DESCRIPTION - DESCRIPTORS: DESCRIPTORS: ACHING

## 2017-09-27 NOTE — PLAN OF CARE
Problem: Patient Care Overview  Goal: Plan of Care/Patient Progress Review  Discharge Planner PT      Patient plan for discharge: Home with OP PT  Current status: Sit to supine with SBA. Sit to/from stand with SBA. Patient ambulates 100' with FWW and SBA. Performs one platform stair with SBA. Tolerates R TKA exercises - HEP issued.   Barriers to return to prior living situation: None anticipated.  Recommendations for discharge: Home with OP PT  Rationale for recommendations: Anticipate safe discharge to home when medically ready. Pt will benefit from continued OP PT to address R LE strength and ROM deficits.        Entered by: Arlet Lu 09/27/2017 9:48 AM           PT PM SESSION: Patient declines due to repeated emesis.

## 2017-09-27 NOTE — PROGRESS NOTES
"[facility]  Hospitalist CONSULT Progress Note  Patient Name: Dilia Jones    MRN: [id number]  Provider:  Theresa Bowles MD  Date:09/27/2017      Requesting Physician:  Paul Barnett MD  Reason for consult:  Post op after RTKA    Summary of Stay: Dilia Jones is a 72 year old female with history of hypertension, hypercholesterolemia, migraines, diverticular disease, cataracts, GERD, cholecystectomy, and left TKA.  She was admitted on 9/25/17 after elective right TKA.  Surgery was uncomplicated.  The hosptialist service was asked to consult to help manage hypertension and other medical problems in the post op period.       Assessment and Plan:      1.  Post op after Right TKA on 9/25/17.  Doing well. Pain control, therapy, and DVT prophylaxis per Dr. Barnett.     2.  Hypertension.  Resume Lisinopril tomorrow.  Check BMP in theam.      3.  GERD.  Resume PTA Prilosec.      4.  Hypercholesterolemia.  Resume PTA Zocor.     5.  Migraines.  Resume PTA Imitrex prn.    6. Hyponatremia : sodium decreased from 134 preop->127-suspect post op SIADH possibly exacerbated by migraine HA, uncontrolled pain and narcotic induced nausea. Urine studies and serum osm pending. Would place her on fluid restriction and recheck sodium in 6 hours      Full code  ASA and PCD's for DVT prophylaxis  Discharge Dispo: home  Estimated Disch Date / # of Days until Discharge: per ortho        Interval History:      Feels nauseous and c/o HA similar to her migraine pain  Feels weaker today  Denies SOB or chest pain          Data reviewed today: I reviewed all new labs and imaging reports over the last 24 hours. I personally reviewed no images or EKG's today.         Physical Exam:      Last Vital Signs:  /69 (BP Location: Right arm)  Pulse 63  Temp 99  F (37.2  C) (Oral)  Resp 16  Ht 1.575 m (5' 2\")  Wt 77.1 kg (170 lb)  SpO2 91%  Breastfeeding? No  BMI 31.09 kg/m2  GENERAL: No apparent distress. Awake, alert, and fully " oriented.  HEENT: Normocephalic, atraumatic. Extraocular movements intact.  CARDIOVASCULAR: Regular rate and rhythm without murmurs or rubs. No S3.  PULMONARY: Clear bilaterally.  ABDOMINAL: Soft, non-tender, non-distended. Bowel sounds normoactive. No hepatosplenomegaly.  EXTREMITIES: No cyanosis or clubbing. No edema.  NEUROLOGICAL: CN 2-12 grossly intact, no focal neurological deficits.  DERMATOLOGICAL: No rash, ulcer, ecchymoses, jaundice.  PSYCH: appropriate           Medications:      All current medications were reviewed.         Data:      All new lab and imaging data was reviewed.   Data       Recent Labs  Lab 09/27/17 0655 09/26/17  0820   WBC 6.3  --    HGB 10.3* 10.7*   HCT 30.8*  --    MCV 96  --      --        Recent Labs  Lab 09/27/17 0655 09/26/17  0820   * 134   POTASSIUM 4.7 4.6   CHLORIDE 94 99   CO2 28 31   ANIONGAP 5 4   * 106*   BUN 19 16   CR 0.98 1.01   GFRESTIMATED 56* 54*   GFRESTBLACK 68 66   YOVANI 8.4* 8.7       No results found for this or any previous visit (from the past 24 hour(s)).    Theresa Bowles MD  Hospitalist  [facility] [address] [place] [address]

## 2017-09-27 NOTE — PLAN OF CARE
Problem: Knee Replacement, Total (Adult)  Goal: Signs and Symptoms of Listed Potential Problems Will be Absent or Manageable (Knee Replacement, Total)  Signs and symptoms of listed potential problems will be absent or manageable by discharge/transition of care (reference Knee Replacement, Total (Adult) CPG).   Outcome: Improving  VSS, O2 sat 88% RA, and 94%  2L NC,  Alert and oriented  X 4, drsg C/D/I, CMS intact, up with assist of 1.  Tramadol given at 0600. will cont to monitor.  Had  Small emesis at midnight, lemon lime  Soda helped. Declined oxycontin this morning.

## 2017-09-27 NOTE — PLAN OF CARE
Problem: Patient Care Overview  Goal: Plan of Care/Patient Progress Review  Discharge Planner OT   Patient plan for discharge: home, possibly later today  Current status: Patient arrived in [place] for OT session, however reported increased nausea. After further discussion, patient declined further toilet and tub transfers. Patient reports she is comfortable to with techniques from previous TKA apprx 2 years ago. Patient able to verbalize technique. Patient declined further OT needs and would prefer to discontinue OT services. Per patient preference will dc from OT services.  Barriers to return to prior living situation: stairs- defer to PT        Recommendations for discharge: anticipate dc home, however as patient will dc from OT services early, will defer to PT   Rationale for recommendations: patient requested to dc from OT services.        Entered by: Jennifer Sánchez 09/27/2017 9:24 AM        Occupational Therapy Discharge Summary     Reason for therapy discharge:    Patient/family request discontinuation of services.     Progress towards therapy goal(s). See goals on Care Plan in [facility] electronic health record for goal details.  Goals partially met.  Barriers to achieving goals:   patient declined further OT services.     Therapy recommendation(s):    No further therapy is recommended.

## 2017-09-27 NOTE — PLAN OF CARE
Problem: Knee Replacement, Total (Adult)  Goal: Signs and Symptoms of Listed Potential Problems Will be Absent or Manageable (Knee Replacement, Total)  Signs and symptoms of listed potential problems will be absent or manageable by discharge/transition of care (reference Knee Replacement, Total (Adult) CPG).   A&Ox4. VSS. CMS intact. C/o mild pain which was relieved by oxycodone. Pt been in bed most of this shift. Dressing CDI, also had ice on knee for comfort. Took IV out as it was causing discomfort when flushing and pt hasnt used any IV meds. Ambulates with A1 gaitbelt and walker. Possible D/C tomorrow or Thursday.

## 2017-09-27 NOTE — PLAN OF CARE
Problem: Patient Care Overview  Goal: Plan of Care/Patient Progress Review  Outcome: Improving  Neuro: alert and orientated. Calm, cooperative.  CMS to right knee WDL   VS: VSS  Tele: NA  LS: Clear.   GI: Bowel sound active. Passing flatus. No BM since surgery. Pt having nausea and emesis multiple times throughout shift. Given PRN zofran and reglan with little relief.   : WDL  Skin: Incision to right knee covered by ace wrap - cms intact.   Pain: pain controled po ultram.  Oxycontin held per pt request. Pt feels this is contributing to nausea.   Transfer: A 1 with walker   IV:  No PIV access.   Diet:  Regular - refused all meals today r/t nausea. 1200 ml fluid restriction.   Plan: TBD. PT,OT, Ortho and hospitalist following

## 2017-09-27 NOTE — PROGRESS NOTES
"Orthopedic Surgery  9/27/2017  POD 2    S: Patient voices no unexpected ortho complaints today. Denies chest pain or shortness of breath. Vomited last carrie.     O: Blood pressure 97/57, pulse 63, temperature 98.4  F (36.9  C), temperature source Oral, resp. rate 18, height 1.575 m (5' 2\"), weight 77.1 kg (170 lb), SpO2 94 %, not currently breastfeeding.  Lab Results   Component Value Date    HGB 10.7 09/26/2017     No results found for: INR  I/O last 3 completed shifts:  In: 2471 [P.O.:620; I.V.:1851]  Out: 1055 [Urine:900; Drains:155]  Distal extremity CMSI bilaterally.  Calves are negative bilaterally, both soft and nontender.  The dressing is C/D/I.      A: Ms. Jones is doing well status post Procedure(s):  ARTHROPLASTY KNEE.    P: Continue physical therapy. Continue DVT pphx with ASA due to high mobility and low risk factors for DVT. Anticipate discharge to home today.    Marialuisa Heard PA-C  [phone]  "

## 2017-09-27 NOTE — PROGRESS NOTES
Pt noted to have 7 point drop in serum sodium, would recommend repeat sodium in 6 hours to ensure this is not worsening further.    Hold discharge atleast until a repeat sodium is obtained    Place pt on fluid restriction and check urine studies

## 2017-09-28 ENCOUNTER — APPOINTMENT (OUTPATIENT)
Dept: PHYSICAL THERAPY | Facility: CLINIC | Age: 72
DRG: 470 | End: 2017-09-28
Attending: ORTHOPAEDIC SURGERY
Payer: MEDICARE

## 2017-09-28 VITALS
RESPIRATION RATE: 16 BRPM | OXYGEN SATURATION: 92 % | DIASTOLIC BLOOD PRESSURE: 81 MMHG | WEIGHT: 170 LBS | TEMPERATURE: 96.3 F | HEART RATE: 63 BPM | BODY MASS INDEX: 31.28 KG/M2 | HEIGHT: 62 IN | SYSTOLIC BLOOD PRESSURE: 133 MMHG

## 2017-09-28 LAB
ANION GAP SERPL CALCULATED.3IONS-SCNC: 4 MMOL/L (ref 3–14)
BUN SERPL-MCNC: 15 MG/DL (ref 7–30)
CALCIUM SERPL-MCNC: 8.5 MG/DL (ref 8.5–10.1)
CHLORIDE SERPL-SCNC: 95 MMOL/L (ref 94–109)
CO2 SERPL-SCNC: 31 MMOL/L (ref 20–32)
CREAT SERPL-MCNC: 0.87 MG/DL (ref 0.52–1.04)
ERYTHROCYTE [DISTWIDTH] IN BLOOD BY AUTOMATED COUNT: 11.8 % (ref 10–15)
GFR SERPL CREATININE-BSD FRML MDRD: 64 ML/MIN/1.7M2
GLUCOSE SERPL-MCNC: 99 MG/DL (ref 70–99)
HCT VFR BLD AUTO: 31.5 % (ref 35–47)
HGB BLD-MCNC: 10.5 G/DL (ref 11.7–15.7)
MCH RBC QN AUTO: 31.6 PG (ref 26.5–33)
MCHC RBC AUTO-ENTMCNC: 33.3 G/DL (ref 31.5–36.5)
MCV RBC AUTO: 95 FL (ref 78–100)
PLATELET # BLD AUTO: 192 10E9/L (ref 150–450)
POTASSIUM SERPL-SCNC: 4.3 MMOL/L (ref 3.4–5.3)
RBC # BLD AUTO: 3.32 10E12/L (ref 3.8–5.2)
SODIUM SERPL-SCNC: 130 MMOL/L (ref 133–144)
WBC # BLD AUTO: 7 10E9/L (ref 4–11)

## 2017-09-28 PROCEDURE — 25000132 ZZH RX MED GY IP 250 OP 250 PS 637: Mod: GY | Performed by: ORTHOPAEDIC SURGERY

## 2017-09-28 PROCEDURE — 97110 THERAPEUTIC EXERCISES: CPT | Mod: GP | Performed by: PHYSICAL THERAPY ASSISTANT

## 2017-09-28 PROCEDURE — 25000132 ZZH RX MED GY IP 250 OP 250 PS 637: Mod: GY | Performed by: HOSPITALIST

## 2017-09-28 PROCEDURE — 25000128 H RX IP 250 OP 636: Performed by: INTERNAL MEDICINE

## 2017-09-28 PROCEDURE — 36415 COLL VENOUS BLD VENIPUNCTURE: CPT | Performed by: HOSPITALIST

## 2017-09-28 PROCEDURE — 99232 SBSQ HOSP IP/OBS MODERATE 35: CPT | Performed by: HOSPITALIST

## 2017-09-28 PROCEDURE — A9270 NON-COVERED ITEM OR SERVICE: HCPCS | Mod: GY | Performed by: HOSPITALIST

## 2017-09-28 PROCEDURE — 97535 SELF CARE MNGMENT TRAINING: CPT | Mod: GO | Performed by: REHABILITATION PRACTITIONER

## 2017-09-28 PROCEDURE — A9270 NON-COVERED ITEM OR SERVICE: HCPCS | Mod: GY | Performed by: PHYSICIAN ASSISTANT

## 2017-09-28 PROCEDURE — 40000133 ZZH STATISTIC OT WARD VISIT: Performed by: REHABILITATION PRACTITIONER

## 2017-09-28 PROCEDURE — 97165 OT EVAL LOW COMPLEX 30 MIN: CPT | Mod: GO | Performed by: REHABILITATION PRACTITIONER

## 2017-09-28 PROCEDURE — 97116 GAIT TRAINING THERAPY: CPT | Mod: GP | Performed by: PHYSICAL THERAPY ASSISTANT

## 2017-09-28 PROCEDURE — A9270 NON-COVERED ITEM OR SERVICE: HCPCS | Mod: GY | Performed by: ORTHOPAEDIC SURGERY

## 2017-09-28 PROCEDURE — 90662 IIV NO PRSV INCREASED AG IM: CPT | Performed by: INTERNAL MEDICINE

## 2017-09-28 PROCEDURE — 40000193 ZZH STATISTIC PT WARD VISIT: Performed by: PHYSICAL THERAPY ASSISTANT

## 2017-09-28 PROCEDURE — 85027 COMPLETE CBC AUTOMATED: CPT | Performed by: HOSPITALIST

## 2017-09-28 PROCEDURE — 99207 ZZC CDG-MDM COMPONENT: MEETS LOW - DOWN CODED: CPT | Performed by: HOSPITALIST

## 2017-09-28 PROCEDURE — 25000132 ZZH RX MED GY IP 250 OP 250 PS 637: Mod: GY | Performed by: PHYSICIAN ASSISTANT

## 2017-09-28 PROCEDURE — 80048 BASIC METABOLIC PNL TOTAL CA: CPT | Performed by: HOSPITALIST

## 2017-09-28 RX ADMIN — TRAMADOL HYDROCHLORIDE 50 MG: 50 TABLET, COATED ORAL at 01:45

## 2017-09-28 RX ADMIN — FERROUS GLUCONATE 324 MG: 324 TABLET ORAL at 08:57

## 2017-09-28 RX ADMIN — CELECOXIB 200 MG: 200 CAPSULE ORAL at 08:57

## 2017-09-28 RX ADMIN — ACETAMINOPHEN 975 MG: 325 TABLET, FILM COATED ORAL at 11:05

## 2017-09-28 RX ADMIN — INFLUENZA A VIRUSA/MICHIGAN/45/2015 X-275 (H1N1) ANTIGEN (FORMALDEHYDE INACTIVATED), INFLUENZA A VIRUS A/HONG KONG/4801/2014 X-263B (H3N2) ANTIGEN (FORMALDEHYDE INACTIVATED), AND INFLUENZA B VIRUS B/BRISBANE/60/2008 ANTIGEN (FORMALDEHYDE INACTIVATED) 0.5 ML: 60; 60; 60 INJECTION, SUSPENSION INTRAMUSCULAR at 11:06

## 2017-09-28 RX ADMIN — ACETAMINOPHEN 975 MG: 325 TABLET, FILM COATED ORAL at 01:29

## 2017-09-28 RX ADMIN — LISINOPRIL 20 MG: 20 TABLET ORAL at 08:58

## 2017-09-28 RX ADMIN — ASPIRIN 325 MG: 325 TABLET, DELAYED RELEASE ORAL at 08:57

## 2017-09-28 RX ADMIN — TRAMADOL HYDROCHLORIDE 100 MG: 50 TABLET, COATED ORAL at 09:00

## 2017-09-28 RX ADMIN — SENNOSIDES AND DOCUSATE SODIUM 2 TABLET: 8.6; 5 TABLET ORAL at 08:58

## 2017-09-28 NOTE — PLAN OF CARE
A/O.  CMS intact.  Dressing D/I.  Ace wrap removed this evening.  Encouraged use of IS.  Refused to walk in hallway this evening, but did walk to et from bathroom with Ax1 et walker.  Nausea improved this shift. Able to tolerate liquids.  Pain controlled with PRN Tramadol.  Voiding.  Remains on fluid restriction.  Will continue to monitor.  Possible discharge home tomorrow.

## 2017-09-28 NOTE — DISCHARGE INSTRUCTIONS
The Doctor has recommended that you have your labs drawn at your primary doctors office 2-3 days Your appointment scheduled for  1100 am at [facility], lab appointment only, for BMP, orders have been faxed to  and Dr. Mejia updated.     Return to clinic in 10-14 days.  Call [phone] to schedule or if you experience any problems before your scheduled appointment.      1. Do exercises at home as instructed by therapist twice a day. Continue outpatient therapy as ordered by your doctor.  2. Ice knee after exercises and therapy.  3. Examine dressing daily for problems.  4. May shower, can get dressing wet, no soaking (no bathtubs, pools or hot tubs)  5. Notify your dentist or physician of your implant so you can get antibiotics before any dental work or before any invasive procedure (ie: colonoscopy)  6. Remove anti-embolism stockings (Omega hose) at bedtime and reapply during the day to prevent swelling.      Aquacel dressing:  DO NOT CHANGE DRESSING DAILY.  Leave this dressing on until follow-up appointment with Orthopedic Surgeon.  Dressing is waterproof, can shower with it on, pat dry when done.  No soaking such as in tub baths, pools or hot tubs        While on narcotic pain medication, to prevent constipation:  1. Drink plenty of water to keep well hydrated   2. May take over the counter Colace or Senna (follow instructions on label)        Call your physician if: ([phone])   1. Persistent fever greater than 101 degrees with body chills or excessive sweating.  2. Increased/persistent redness, localized warmth, tenderness, drainage or swelling at dressing site. Greater than 50% drainage on dressing.   3. Persistent pain not controlled with oral pain medications, ice and rest.   4. No bowel movement in 3 days (may use Milk of Magnesia or other over the counter remedy).  5. Chest pain, shortness of breath, and/or calf pain with excessive swelling.  6. Generalized feeling of  illness.  7. Any other questions or concerns related to your surgery/recovery.    Thank you for allowing [facility] to participate in your cares!!

## 2017-09-28 NOTE — PLAN OF CARE
Problem: Patient Care Overview  Goal: Plan of Care/Patient Progress Review  Discharge Planner PT   Patient plan for discharge: home later today  Current status: Mod I gait with RW to 125 feet, Ind basic transfers and stairs no DME needs ROM 6-102. All goals are met  Barriers to return to prior living situation: none  Recommendations for discharge: PT- Per plan established by the Physical Therapist, according to functional mobility the discharge recommendation is home today     Rationale for recommendations: all goals are met. Will continue to progress with OP PT.       Entered by: Jennifer Rivera 09/28/2017 9:57 AM

## 2017-09-28 NOTE — PROGRESS NOTES
"[facility]  Hospitalist CONSULT Progress Note  Patient Name: Dilia Jones    MRN: [id number]  Provider:  Theresa Bowles MD  Date:09/28/2017      Requesting Physician:  Paul Barnett MD  Reason for consult:  Post op after RTKA    Summary of Stay: Dilia Jones is a 72 year old female with history of hypertension, hypercholesterolemia, migraines, diverticular disease, cataracts, GERD, cholecystectomy, and left TKA.  She was admitted on 9/25/17 after elective right TKA.  Surgery was uncomplicated.  The hosptialist service was asked to consult to help manage hypertension and other medical problems in the post op period.       Assessment and Plan:      1.  Post op after Right TKA on 9/25/17.  Doing well. Pain control, therapy, and DVT prophylaxis per Dr. Barnett.     2.  Hypertension.  Resume Lisinopril    Check BMP in theam.      3.  GERD.  Resume PTA Prilosec.      4.  Hypercholesterolemia.  Resume PTA Zocor.     5.  Migraines.  Resume PTA Imitrex prn.    6. Hyponatremia Na 125: sodium decreased from 134 preop->125-suspect post op SIADH possibly exacerbated by migraine HA and severe nausea from oxycontin,    Sodium improved with improvement in nausea after OxyContin was dc-ed .   Repeat BMP 3 days after dc (monday)     Full code  ASA and PCD's for DVT prophylaxis  Discharge Dispo: home  Estimated Disch Date / # of Days until Discharge: per ortho        Interval History:      Feels much better after discontinuation of oxycontin   No HA today  No nausea        Data reviewed today: I reviewed all new labs and imaging reports over the last 24 hours. I personally reviewed no images or EKG's today.         Physical Exam:      Last Vital Signs:  /81 (BP Location: Left arm)  Pulse 63  Temp 96.3  F (35.7  C) (Oral)  Resp 16  Ht 1.575 m (5' 2\")  Wt 77.1 kg (170 lb)  SpO2 92%  Breastfeeding? No  BMI 31.09 kg/m2  GENERAL: No apparent distress. Awake, alert, and fully oriented.  HEENT: Normocephalic, " atraumatic. Extraocular movements intact.  CARDIOVASCULAR: Regular rate and rhythm without murmurs or rubs. No S3.  PULMONARY: Clear bilaterally.  ABDOMINAL: Soft, non-tender, non-distended. Bowel sounds normoactive. No hepatosplenomegaly.  EXTREMITIES: No cyanosis or clubbing. No edema.  NEUROLOGICAL: CN 2-12 grossly intact, no focal neurological deficits.  DERMATOLOGICAL: No rash, ulcer, ecchymoses, jaundice.  PSYCH: appropriate           Medications:      All current medications were reviewed.         Data:      All new lab and imaging data was reviewed.   Data       Recent Labs  Lab 09/28/17  0726 09/27/17  0655 09/26/17  0820   WBC 7.0 6.3  --    HGB 10.5* 10.3* 10.7*   HCT 31.5* 30.8*  --    MCV 95 96  --     180  --        Recent Labs  Lab 09/28/17  0726 09/27/17  2225 09/27/17  1320 09/27/17  0655 09/26/17  0820   * 125* 128* 127* 134   POTASSIUM 4.3  --   --  4.7 4.6   CHLORIDE 95  --   --  94 99   CO2 31  --   --  28 31   ANIONGAP 4  --   --  5 4   GLC 99  --   --  107* 106*   BUN 15  --   --  19 16   CR 0.87  --   --  0.98 1.01   GFRESTIMATED 64  --   --  56* 54*   GFRESTBLACK 78  --   --  68 66   YOVANI 8.5  --   --  8.4* 8.7       No results found for this or any previous visit (from the past 24 hour(s)).    Theresa Bowles MD  Hospitalist  [facility] [address] [place] [address]

## 2017-09-28 NOTE — PROGRESS NOTES
Care Transitions Team: Following for CC, discharge planning, and disposition.        Per chart review pt NA low with recommendation sf or BMP at PCP in 2-3 days, today is Thursday, weekend clinic is closed rescheduled soonest possible,     Monday, spoke to pt about this who relays that she has OPPT on Monday as well and would like the appointment to follow.    1100 am at [facility], lab appointment only, for BMP, orders faxed to .     Will provided brief PCP handoff regarding.     Magalis Hinton RN   Care Transitions Team  [phone]

## 2017-09-28 NOTE — PROGRESS NOTES
PT was nauseous all day yesterday due to oxycontin which was discontinued, also developed migraine - now improved    Hyponatremia likely SIADH response due to severe nausea and HA, now improved    Can dc home today- but would not prescribe narcotics, it appears that her pain is adequately controlled with tramadol and she may cont that    Final d/c per ortho    Would recommend repeat BMP in 2-3 days as outpatient

## 2017-09-28 NOTE — PROGRESS NOTES
Transition Communication Hand-off for Care Transitions to Next Level of Care Provider    Name: Dilia Jones  MRN #: [id number]  Primary Care Provider: [facility]     Primary Clinic: [address] [facility] [address]     Reason for Hospitalization:  DJD  S/P total knee arthroplasty  Admit Date/Time: 9/25/2017  5:25 AM  Discharge Date: 9/28/2017   Payor Source: Payor: BCBS / Plan: BCBS PLATINUM BLUE / Product Type: PPO /     Readmission Assessment Measure (JUDAH) Risk Score/category: LOW     Discharge Home with Assist of daughter     Follow-up specialty is recommended: Yes  , Orthopedic    Return to clinic in 10-14 days.  Call [phone] to schedule or if you experience any problems before your scheduled appointment    Follow-up plan:   The Doctor has recommended that you have your labs drawn at your primary doctors office 2-3 days Your appointment scheduled for  1100 am at [facility], lab appointment only, for BMP, orders have been faxed to  and Dr. Mejia updated.     Any outstanding tests or procedures:        Referrals     Future Labs/Procedures    Physical Therapy Referral     Comments:    *This therapy referral will be filtered to a centralized scheduling office at [facility] and the patient will receive a call to schedule an appointment at a [facility] location most convenient for them. *     For [facility] scheduling, Call [phone]  Treatment: Evaluation & Treatment  Special Instructions/Modalities: Physical therapy to be done at [facility]  Call to set up appointment if not already scheduled; [phone]  Special Programs: Physical therapy to be done at [facility]  Call to set up appointment if not already scheduled; [phone]    Please be aware that coverage of these services is subject to the terms and limitations of your health insurance plan.  Call member services at your  "health plan with any benefit or coverage questions.      **Note to Provider:  If you are referring outside of [facility] for the therapy appointment, please list the name of the location in the \"special instructions\" above, print the referral and give to the patient to schedule the appointment.            Key Recommendations:    Post operative--Hyponatremia,  likely SIADH response due to severe nausea and HA, now improved  Would recommend repeat BMP in 2-3 days as outpatient, soonest to get into clinic was scheduled for Monday at 1100, if abnormal have Dr. Mejia address. Thanks              APPOINTMENT HAS BEEN SCHEDULED AS ABOVE    Magalis Hinton    AVS/Discharge Summary is the source of truth; this is a helpful guide for improved communication of patient story  "

## 2017-10-18 NOTE — DISCHARGE SUMMARY
Diagnosis: right DJD knee  Procedure: right Cemented total knee replacement  Surgeon: Pedro Barnett MD  Patient underwent total knee replacement without complication. Patient had typical transient post-op anemia. Patient's hospital stay included physical therapy and DVT prophylaxis. After discussion with patient regarding no history of DVT and current high mobility, patient is discharged with ASA for home DVT pphx. Patient will follow -up in the office 10-14days post-op for wound check. Please refer to chart for any other specifics of this hospital stay.  Marialuisa Heard PA-C

## 2020-11-23 ENCOUNTER — APPOINTMENT (OUTPATIENT)
Dept: GENERAL RADIOLOGY | Facility: CLINIC | Age: 75
End: 2020-11-23
Attending: EMERGENCY MEDICINE
Payer: MEDICARE

## 2020-11-23 ENCOUNTER — HOSPITAL ENCOUNTER (EMERGENCY)
Facility: CLINIC | Age: 75
Discharge: HOME | End: 2020-11-23
Attending: EMERGENCY MEDICINE | Admitting: EMERGENCY MEDICINE
Payer: MEDICARE

## 2020-11-23 VITALS
HEART RATE: 77 BPM | DIASTOLIC BLOOD PRESSURE: 85 MMHG | OXYGEN SATURATION: 97 % | RESPIRATION RATE: 16 BRPM | SYSTOLIC BLOOD PRESSURE: 130 MMHG | BODY MASS INDEX: 32.01 KG/M2 | TEMPERATURE: 97.8 F | WEIGHT: 175 LBS

## 2020-11-23 DIAGNOSIS — R11.2 NON-INTRACTABLE VOMITING WITH NAUSEA, UNSPECIFIED VOMITING TYPE: ICD-10-CM

## 2020-11-23 LAB
ANION GAP SERPL CALCULATED.3IONS-SCNC: 8 MMOL/L (ref 3–14)
BASOPHILS # BLD AUTO: 0.1 10E9/L (ref 0–0.2)
BASOPHILS NFR BLD AUTO: 0.5 %
BUN SERPL-MCNC: 40 MG/DL (ref 7–30)
CALCIUM SERPL-MCNC: 10 MG/DL (ref 8.5–10.1)
CHLORIDE SERPL-SCNC: 99 MMOL/L (ref 94–109)
CO2 SERPL-SCNC: 31 MMOL/L (ref 20–32)
CREAT SERPL-MCNC: 1.17 MG/DL (ref 0.52–1.04)
DIFFERENTIAL METHOD BLD: NORMAL
EOSINOPHIL # BLD AUTO: 0 10E9/L (ref 0–0.7)
EOSINOPHIL NFR BLD AUTO: 0.1 %
ERYTHROCYTE [DISTWIDTH] IN BLOOD BY AUTOMATED COUNT: 12.7 % (ref 10–15)
GFR SERPL CREATININE-BSD FRML MDRD: 46 ML/MIN/{1.73_M2}
GLUCOSE SERPL-MCNC: 158 MG/DL (ref 70–99)
HCT VFR BLD AUTO: 44.7 % (ref 35–47)
HGB BLD-MCNC: 14.8 G/DL (ref 11.7–15.7)
IMM GRANULOCYTES # BLD: 0.1 10E9/L (ref 0–0.4)
IMM GRANULOCYTES NFR BLD: 0.5 %
INTERPRETATION ECG - MUSE: NORMAL
LYMPHOCYTES # BLD AUTO: 1.4 10E9/L (ref 0.8–5.3)
LYMPHOCYTES NFR BLD AUTO: 13.7 %
MAGNESIUM SERPL-MCNC: 2.3 MG/DL (ref 1.6–2.3)
MCH RBC QN AUTO: 31.7 PG (ref 26.5–33)
MCHC RBC AUTO-ENTMCNC: 33.1 G/DL (ref 31.5–36.5)
MCV RBC AUTO: 96 FL (ref 78–100)
MONOCYTES # BLD AUTO: 0.7 10E9/L (ref 0–1.3)
MONOCYTES NFR BLD AUTO: 6.9 %
NEUTROPHILS # BLD AUTO: 8.3 10E9/L (ref 1.6–8.3)
NEUTROPHILS NFR BLD AUTO: 78.3 %
NRBC # BLD AUTO: 0 10*3/UL
NRBC BLD AUTO-RTO: 0 /100
PLATELET # BLD AUTO: 380 10E9/L (ref 150–450)
POTASSIUM SERPL-SCNC: 3.5 MMOL/L (ref 3.4–5.3)
RBC # BLD AUTO: 4.67 10E12/L (ref 3.8–5.2)
SODIUM SERPL-SCNC: 138 MMOL/L (ref 133–144)
TROPONIN I SERPL-MCNC: <0.015 UG/L (ref 0–0.04)
WBC # BLD AUTO: 10.5 10E9/L (ref 4–11)

## 2020-11-23 PROCEDURE — 96374 THER/PROPH/DIAG INJ IV PUSH: CPT

## 2020-11-23 PROCEDURE — 96361 HYDRATE IV INFUSION ADD-ON: CPT

## 2020-11-23 PROCEDURE — 250N000011 HC RX IP 250 OP 636: Performed by: EMERGENCY MEDICINE

## 2020-11-23 PROCEDURE — 80048 BASIC METABOLIC PNL TOTAL CA: CPT | Performed by: EMERGENCY MEDICINE

## 2020-11-23 PROCEDURE — 85025 COMPLETE CBC W/AUTO DIFF WBC: CPT | Performed by: EMERGENCY MEDICINE

## 2020-11-23 PROCEDURE — 258N000003 HC RX IP 258 OP 636: Performed by: EMERGENCY MEDICINE

## 2020-11-23 PROCEDURE — 71045 X-RAY EXAM CHEST 1 VIEW: CPT

## 2020-11-23 PROCEDURE — 84484 ASSAY OF TROPONIN QUANT: CPT | Performed by: EMERGENCY MEDICINE

## 2020-11-23 PROCEDURE — 83735 ASSAY OF MAGNESIUM: CPT | Performed by: EMERGENCY MEDICINE

## 2020-11-23 PROCEDURE — 99285 EMERGENCY DEPT VISIT HI MDM: CPT | Mod: 25

## 2020-11-23 RX ORDER — ONDANSETRON 4 MG/1
4 TABLET, ORALLY DISINTEGRATING ORAL EVERY 8 HOURS PRN
Qty: 10 TABLET | Refills: 0 | Status: SHIPPED | OUTPATIENT
Start: 2020-11-23 | End: 2020-11-26

## 2020-11-23 RX ORDER — ONDANSETRON 2 MG/ML
4 INJECTION INTRAMUSCULAR; INTRAVENOUS ONCE
Status: COMPLETED | OUTPATIENT
Start: 2020-11-23 | End: 2020-11-23

## 2020-11-23 RX ADMIN — ONDANSETRON 4 MG: 2 INJECTION INTRAMUSCULAR; INTRAVENOUS at 08:19

## 2020-11-23 RX ADMIN — SODIUM CHLORIDE 500 ML: 9 INJECTION, SOLUTION INTRAVENOUS at 08:18

## 2020-11-23 ASSESSMENT — ENCOUNTER SYMPTOMS
SHORTNESS OF BREATH: 0
DIARRHEA: 0
ABDOMINAL PAIN: 1
NAUSEA: 1
FEVER: 0
VOMITING: 1

## 2020-11-23 NOTE — ED TRIAGE NOTES
Patient reports nausea and vomiting since Saturday evening. She also reports chest pain prior to vomiting which she feels is related to reflux. She has not been able to take her medications or keep down any fluids for 2 days.

## 2020-11-23 NOTE — ED AVS SNAPSHOT
[facility]  [address]  [facility] [address]  Phone: [phone]  Fax: [phone]                                    Dilia Jones   MRN: [id number]    Department: [facility]   Date of Visit: 11/23/2020           After Visit Summary Signature Page    I have received my discharge instructions, and my questions have been answered. I have discussed any challenges I see with this plan with the nurse or doctor.    ..........................................................................................................................................  Patient/Patient Representative Signature      ..........................................................................................................................................  Patient Representative Print Name and Relationship to Patient    ..................................................               ................................................  Date                                   Time    ..........................................................................................................................................  Reviewed by Signature/Title    ...................................................              ..............................................  Date                                               Time          22EPIC Rev 08/18

## 2020-11-23 NOTE — DISCHARGE INSTRUCTIONS
Diagnosis: Nausea and vomiting  What do you do next:   Continue your home medications unless we have specifically changed them  I have prescribed a nausea medication.  Please take this as directed.  Follow up as indicated below    When do you return: If you have uncontrollable fevers, intractable vomiting or diarrhea, severe abdominal pain, or any other symptoms that concern you, please return to the ED for reevaluation.    Thank you for allowing us to care for you today.

## 2020-11-23 NOTE — ED PROVIDER NOTES
History     Chief Complaint:    Nausea & Vomiting      HPI   Dilia Jones is a 75 year old female who presents with nausea and vomiting.  The patient states that late Saturday night (11/21) or early Sunday morning (11/22), she noted nausea and vomiting.  The patient states she has known acid reflux and occasionally gets this so she initially was not too concerned though those sensations have persisted through today and have made it so she has not been able to take her medications for the last 2 days.  She states she is never had anything this long of duration with acid reflux in the past.  She also notes a little bit of right-sided chest discomfort that started Saturday as well, but denies fever or shortness of breath.  The patient does note mild epigastric discomfort which she associates with the vomiting she has been experiencing.    Allergies:    Allergies   Allergen Reactions     Penicillins Rash        Medications:           ondansetron (ZOFRAN ODT) 4 MG ODT tab       acetaminophen (TYLENOL) 325 MG tablet       hydrOXYzine (ATARAX) 25 MG tablet       LISINOPRIL PO       multivitamin, therapeutic with minerals (MULTI-VITAMIN) TABS tablet       OMEPRAZOLE PO       senna-docusate (SENOKOT-S;PERICOLACE) 8.6-50 MG per tablet       SIMVASTATIN PO       SUMATRIPTAN SUCCINATE PO       traMADol (ULTRAM) 50 MG tablet      Past Medical History:      Past Medical History:   Diagnosis Date     Arthritis      Gastroesophageal reflux disease      High cholesterol      Hypertension      Other chronic pain      PONV (postoperative nausea and vomiting)        Past Surgical History:      Past Surgical History:   Procedure Laterality Date     ARTHROPLASTY KNEE Left 12/2/2015    Procedure: ARTHROPLASTY KNEE;  Surgeon: ePdro Barnett MD;  Location:  OR     ARTHROPLASTY KNEE Right 9/25/2017    Procedure: ARTHROPLASTY KNEE;  Right total knee arthroplasty;  Surgeon: Pedro Barnett MD;  Location:  OR      CHOLECYSTECTOMY       CYSTECTOMY PILONIDAL       EXCISE GANGLION WRIST Left        Family History:    No pertinent family history of GI conditions    Social History:    Marital Status:  Single [1]  Social History     Tobacco Use     Smoking status: Never Smoker     Smokeless tobacco: Never Used   Substance Use Topics     Alcohol use: Yes     Comment: 1 drink weekly     Drug use: No        Review of Systems   Constitutional: Negative for fever.   Respiratory: Negative for shortness of breath.    Cardiovascular: Positive for chest pain.   Gastrointestinal: Positive for abdominal pain, nausea and vomiting. Negative for diarrhea.   All other systems reviewed and are negative.    Physical Exam   First Vitals:  BP: (!) 113/96  Pulse: 110  Temp: 97.8  F (36.6  C)  Resp: 16  Weight: 79.4 kg (175 lb)  SpO2: 94 %    Patient Vitals for the past 24 hrs:   BP Temp Temp src Pulse Resp SpO2 Weight   11/23/20 1005 130/85 -- -- 77 -- 97 % --   11/23/20 0753 (!) 113/96 97.8  F (36.6  C) Temporal 110 16 94 % 79.4 kg (175 lb)         Physical Exam  Constitutional: Vital signs reviewed as above.   Head: No external signs of trauma noted.  Eyes: Pupils are equal, round, and reactive to light.   Neck: No JVD noted  Cardiovascular: Normal rate at the time of my exam, regular rhythm and normal heart sounds.  No murmur heard. Equal B/L peripheral pulses.  Pulmonary/Chest: Effort normal and breath sounds normal. No respiratory distress. Patient has no wheezes. Patient has no rales.   Gastrointestinal: Soft. There is mild epigastric discomfort to palpation. No RUQ tenderness. No other abdominal tenderness.   Musculoskeletal/Extremities: No edema noted. Normal tone.  Neurological: Patient is alert and oriented to person, place, and time.   Skin: Skin is warm and dry. There is no diaphoresis noted.   Psychiatric: The patient appears calm.      Emergency Department Course   ECG:  ECG Taken at 08:25    NSR  Nonspecific ST abnormality  Abnormal  EKG  Rate: 82. NV: 160. QRS: 78. QTc: 462.  P-R-T Axes:   6   7   15  When compared with 9/5/2017, there appears to be some slight scooping of the ST segments in V3 (though with a normal and upright T wave), otherwise no significant change.  Interpreted by me at 0829 on 11/23/2020      Imaging:  Radiographic findings were communicated with the patient who voiced understanding of the findings.    XR Chest Port 1 View   Preliminary Result   IMPRESSION: Large hiatal hernia. Pulmonary vasculature is not   distended. Lungs are clear. No pleural fluid.            Laboratory:  Labs Ordered and Resulted from Time of ED Arrival Up to the Time of Departure from the ED   BASIC METABOLIC PANEL - Abnormal; Notable for the following components:       Result Value    Glucose 158 (*)     Urea Nitrogen 40 (*)     Creatinine 1.17 (*)     GFR Estimate 46 (*)     GFR Estimate If Black 53 (*)     All other components within normal limits   CBC WITH PLATELETS DIFFERENTIAL   MAGNESIUM   TROPONIN I   PERIPHERAL IV CATHETER     Interventions:  Medications   ondansetron (ZOFRAN) injection 4 mg (4 mg Intravenous Given 11/23/20 0819)   0.9% sodium chloride BOLUS (0 mLs Intravenous Stopped 11/23/20 1001)       Emergency Department Course:    ED Course as of Nov 23 1010   Mon Nov 23, 2020   0801 Dr. Massey' evaluation      0936 Rechecked and updated.  Patient feels much better.  She notes she has not vomited since she got here.  Feels comfortable going home.          Impression & Plan      Medical Decision Making:  This 75-year-old female patient presents to the ED with nausea and vomiting.  Please see the HPI and exam for specifics.  The patient remained well in the ED.  She improved with interventions and did not have any further vomiting.  She did note a little bit of right-sided chest discomfort that had been present since Saturday.  Based on her EKG here which seems largely consistent with her previous EKGs, a normal troponin, and the fact  that she is low risk per the ED ACS score, I believe she is low risk for ACS.  Since the patient's nausea and vomiting symptoms improved, and since she felt better, she was comfortable being discharged.  I will encourage primary care follow-up and return to the ED with any new or worsening symptoms.  Anticipatory guidance given prior to discharge.    Covid-19  Dilia Jones was evaluated during a global COVID-19 pandemic, which necessitated consideration that the patient might be at risk for infection with the SARS-CoV-2 virus that causes COVID-19.   Applicable protocols for evaluation were followed during the patient's care.   COVID-19 was considered as part of the patient's evaluation. The patient declined testing.        Diagnosis:    ICD-10-CM    1. Non-intractable vomiting with nausea, unspecified vomiting type  R11.2        Disposition:  discharged to home    Discharge Medications:  New Prescriptions    ONDANSETRON (ZOFRAN ODT) 4 MG ODT TAB    Take 1 tablet (4 mg) by mouth every 8 hours as needed for nausea or vomiting       Eliazar Massey, DO  11/23/2020   [facility] EMERGENCY DEPT       Eliazar Massey, DO  11/23/20 1012       Eliazar Massey, DO  11/23/20 1125

## 2020-11-24 ENCOUNTER — HOSPITAL ENCOUNTER (EMERGENCY)
Facility: CLINIC | Age: 75
Discharge: HOME | End: 2020-11-24
Attending: EMERGENCY MEDICINE | Admitting: EMERGENCY MEDICINE
Payer: MEDICARE

## 2020-11-24 VITALS
SYSTOLIC BLOOD PRESSURE: 139 MMHG | HEART RATE: 91 BPM | RESPIRATION RATE: 16 BRPM | TEMPERATURE: 98.6 F | DIASTOLIC BLOOD PRESSURE: 94 MMHG | BODY MASS INDEX: 31.77 KG/M2 | OXYGEN SATURATION: 97 % | WEIGHT: 173.72 LBS

## 2020-11-24 DIAGNOSIS — R53.81 MALAISE AND FATIGUE: ICD-10-CM

## 2020-11-24 DIAGNOSIS — R53.83 MALAISE AND FATIGUE: ICD-10-CM

## 2020-11-24 DIAGNOSIS — E86.0 DEHYDRATION: ICD-10-CM

## 2020-11-24 DIAGNOSIS — R19.7 DIARRHEA, UNSPECIFIED TYPE: ICD-10-CM

## 2020-11-24 LAB
ALBUMIN SERPL-MCNC: 3.6 G/DL (ref 3.4–5)
ALBUMIN UR-MCNC: 30 MG/DL
ALP SERPL-CCNC: 64 U/L (ref 40–150)
ALT SERPL W P-5'-P-CCNC: 16 U/L (ref 0–50)
ANION GAP SERPL CALCULATED.3IONS-SCNC: 6 MMOL/L (ref 3–14)
APPEARANCE UR: ABNORMAL
AST SERPL W P-5'-P-CCNC: 15 U/L (ref 0–45)
BACTERIA #/AREA URNS HPF: ABNORMAL /HPF
BASOPHILS # BLD AUTO: 0 10E9/L (ref 0–0.2)
BASOPHILS NFR BLD AUTO: 0.2 %
BILIRUB SERPL-MCNC: 1.2 MG/DL (ref 0.2–1.3)
BILIRUB UR QL STRIP: NEGATIVE
BUN SERPL-MCNC: 41 MG/DL (ref 7–30)
CALCIUM SERPL-MCNC: 9.4 MG/DL (ref 8.5–10.1)
CHLORIDE SERPL-SCNC: 99 MMOL/L (ref 94–109)
CO2 SERPL-SCNC: 31 MMOL/L (ref 20–32)
COLOR UR AUTO: YELLOW
CREAT SERPL-MCNC: 1.38 MG/DL (ref 0.52–1.04)
DIFFERENTIAL METHOD BLD: ABNORMAL
EOSINOPHIL # BLD AUTO: 0 10E9/L (ref 0–0.7)
EOSINOPHIL NFR BLD AUTO: 0.2 %
ERYTHROCYTE [DISTWIDTH] IN BLOOD BY AUTOMATED COUNT: 12.7 % (ref 10–15)
GFR SERPL CREATININE-BSD FRML MDRD: 38 ML/MIN/{1.73_M2}
GLUCOSE SERPL-MCNC: 131 MG/DL (ref 70–99)
GLUCOSE UR STRIP-MCNC: NEGATIVE MG/DL
HCT VFR BLD AUTO: 44.4 % (ref 35–47)
HGB BLD-MCNC: 14.8 G/DL (ref 11.7–15.7)
HGB UR QL STRIP: ABNORMAL
HYALINE CASTS #/AREA URNS LPF: 26 /LPF (ref 0–2)
IMM GRANULOCYTES # BLD: 0 10E9/L (ref 0–0.4)
IMM GRANULOCYTES NFR BLD: 0.2 %
KETONES UR STRIP-MCNC: NEGATIVE MG/DL
LEUKOCYTE ESTERASE UR QL STRIP: ABNORMAL
LIPASE SERPL-CCNC: 498 U/L (ref 73–393)
LYMPHOCYTES # BLD AUTO: 0.6 10E9/L (ref 0.8–5.3)
LYMPHOCYTES NFR BLD AUTO: 6.7 %
MCH RBC QN AUTO: 32.2 PG (ref 26.5–33)
MCHC RBC AUTO-ENTMCNC: 33.3 G/DL (ref 31.5–36.5)
MCV RBC AUTO: 97 FL (ref 78–100)
MONOCYTES # BLD AUTO: 0.6 10E9/L (ref 0–1.3)
MONOCYTES NFR BLD AUTO: 6.4 %
MUCOUS THREADS #/AREA URNS LPF: PRESENT /LPF
NEUTROPHILS # BLD AUTO: 7.6 10E9/L (ref 1.6–8.3)
NEUTROPHILS NFR BLD AUTO: 86.3 %
NITRATE UR QL: NEGATIVE
NRBC # BLD AUTO: 0 10*3/UL
NRBC BLD AUTO-RTO: 0 /100
PH UR STRIP: 5.5 PH (ref 5–7)
PLATELET # BLD AUTO: 309 10E9/L (ref 150–450)
POTASSIUM SERPL-SCNC: 3.2 MMOL/L (ref 3.4–5.3)
PROT SERPL-MCNC: 8.2 G/DL (ref 6.8–8.8)
RBC # BLD AUTO: 4.6 10E12/L (ref 3.8–5.2)
RBC #/AREA URNS AUTO: 7 /HPF (ref 0–2)
SODIUM SERPL-SCNC: 136 MMOL/L (ref 133–144)
SOURCE: ABNORMAL
SP GR UR STRIP: 1.03 (ref 1–1.03)
SQUAMOUS #/AREA URNS AUTO: 8 /HPF (ref 0–1)
UROBILINOGEN UR STRIP-MCNC: NORMAL MG/DL (ref 0–2)
WBC # BLD AUTO: 8.8 10E9/L (ref 4–11)
WBC #/AREA URNS AUTO: 8 /HPF (ref 0–5)

## 2020-11-24 PROCEDURE — 250N000013 HC RX MED GY IP 250 OP 250 PS 637: Mod: GY | Performed by: EMERGENCY MEDICINE

## 2020-11-24 PROCEDURE — 85025 COMPLETE CBC W/AUTO DIFF WBC: CPT | Performed by: EMERGENCY MEDICINE

## 2020-11-24 PROCEDURE — 96374 THER/PROPH/DIAG INJ IV PUSH: CPT

## 2020-11-24 PROCEDURE — 250N000009 HC RX 250: Performed by: EMERGENCY MEDICINE

## 2020-11-24 PROCEDURE — 81001 URINALYSIS AUTO W/SCOPE: CPT | Performed by: EMERGENCY MEDICINE

## 2020-11-24 PROCEDURE — 99284 EMERGENCY DEPT VISIT MOD MDM: CPT | Mod: 25

## 2020-11-24 PROCEDURE — C9803 HOPD COVID-19 SPEC COLLECT: HCPCS

## 2020-11-24 PROCEDURE — 258N000003 HC RX IP 258 OP 636: Performed by: EMERGENCY MEDICINE

## 2020-11-24 PROCEDURE — 250N000011 HC RX IP 250 OP 636: Performed by: EMERGENCY MEDICINE

## 2020-11-24 PROCEDURE — U0003 INFECTIOUS AGENT DETECTION BY NUCLEIC ACID (DNA OR RNA); SEVERE ACUTE RESPIRATORY SYNDROME CORONAVIRUS 2 (SARS-COV-2) (CORONAVIRUS DISEASE [COVID-19]), AMPLIFIED PROBE TECHNIQUE, MAKING USE OF HIGH THROUGHPUT TECHNOLOGIES AS DESCRIBED BY CMS-2020-01-R: HCPCS | Performed by: EMERGENCY MEDICINE

## 2020-11-24 PROCEDURE — 96361 HYDRATE IV INFUSION ADD-ON: CPT

## 2020-11-24 PROCEDURE — 83690 ASSAY OF LIPASE: CPT | Performed by: EMERGENCY MEDICINE

## 2020-11-24 PROCEDURE — 80053 COMPREHEN METABOLIC PANEL: CPT | Performed by: EMERGENCY MEDICINE

## 2020-11-24 RX ORDER — ONDANSETRON 2 MG/ML
4 INJECTION INTRAMUSCULAR; INTRAVENOUS ONCE
Status: COMPLETED | OUTPATIENT
Start: 2020-11-24 | End: 2020-11-24

## 2020-11-24 RX ADMIN — ONDANSETRON 4 MG: 2 INJECTION INTRAMUSCULAR; INTRAVENOUS at 14:51

## 2020-11-24 RX ADMIN — SODIUM CHLORIDE, POTASSIUM CHLORIDE, SODIUM LACTATE AND CALCIUM CHLORIDE 1000 ML: 600; 310; 30; 20 INJECTION, SOLUTION INTRAVENOUS at 14:58

## 2020-11-24 RX ADMIN — LIDOCAINE HYDROCHLORIDE 30 ML: 20 SOLUTION ORAL; TOPICAL at 14:51

## 2020-11-24 RX ADMIN — SODIUM CHLORIDE, POTASSIUM CHLORIDE, SODIUM LACTATE AND CALCIUM CHLORIDE 1000 ML: 600; 310; 30; 20 INJECTION, SOLUTION INTRAVENOUS at 16:01

## 2020-11-24 NOTE — ED AVS SNAPSHOT
[facility] Emergency Dept  [address]  [facility] [address]  Phone: [phone]  Fax: [phone]                                    Dilia Jones   MRN: [id number]    Department: [facility] Emergency Dept   Date of Visit: 11/24/2020           After Visit Summary Signature Page    I have received my discharge instructions, and my questions have been answered. I have discussed any challenges I see with this plan with the nurse or doctor.    ..........................................................................................................................................  Patient/Patient Representative Signature      ..........................................................................................................................................  Patient Representative Print Name and Relationship to Patient    ..................................................               ................................................  Date                                   Time    ..........................................................................................................................................  Reviewed by Signature/Title    ...................................................              ..............................................  Date                                               Time          22EPIC Rev 08/18

## 2020-11-24 NOTE — ED PROVIDER NOTES
[facility] Provider Note  [facility] Emergency Department  2:31 PM  11/24/2020    Dilia Princeman  75 year oldfemale    Diarrhea, Vomiting, Dehydration, Generalized weakness    HPI:  75 y F seen in ED yesterday for nb/nb vomiting, negative w/u (labs, trop, CXR).  Since dc has had 7 episodes nonbloody diarrhea.  + assoc nausea, but no abd pain/vomiting/dysuria/cp/sob/fever.  Does not feel bloated.  Went to UC before coming here but they could not do IVF and so sent here.        ROS: 10 point ROS is negative other than mentioned above in HPI    Past Medical History:   Diagnosis Date     Arthritis      Gastroesophageal reflux disease      High cholesterol      Hypertension     No cardiologist     Other chronic pain     Joint pain for 5 years.     PONV (postoperative nausea and vomiting)      Past Surgical History:   Procedure Laterality Date     ARTHROPLASTY KNEE Left 12/2/2015    Procedure: ARTHROPLASTY KNEE;  Surgeon: Pedro Barnett MD;  Location: [facility] OR     ARTHROPLASTY KNEE Right 9/25/2017    Procedure: ARTHROPLASTY KNEE;  Right total knee arthroplasty;  Surgeon: Pedro Barnett MD;  Location: [facility] OR     CHOLECYSTECTOMY       CYSTECTOMY PILONIDAL       EXCISE GANGLION WRIST Left          Social History     Socioeconomic History     Marital status:      Spouse name: Not on file     Number of children: Not on file     Years of education: Not on file     Highest education level: Not on file   Occupational History     Not on file   Social Needs     Financial resource strain: Not on file     Food insecurity     Worry: Not on file     Inability: Not on file     Transportation needs     Medical: Not on file     Non-medical: Not on file   Tobacco Use     Smoking status: Never Smoker     Smokeless tobacco: Never Used   Substance and Sexual Activity     Alcohol use: Yes     Comment: 1 drink weekly     Drug use: No     Sexual activity: Not on file   Lifestyle     Physical activity     Days per week:  Not on file     Minutes per session: Not on file     Stress: Not on file   Relationships     Social connections     Talks on phone: Not on file     Gets together: Not on file     Attends [employer] service: Not on file     Active member of club or organization: Not on file     Attends meetings of clubs or organizations: Not on file     Relationship status: Not on file     Intimate partner violence     Fear of current or ex partner: Not on file     Emotionally abused: Not on file     Physically abused: Not on file     Forced sexual activity: Not on file   Other Topics Concern     Not on file   Social History Narrative     Not on file         No current facility-administered medications for this encounter.      Current Outpatient Medications   Medication     acetaminophen (TYLENOL) 325 MG tablet     hydrOXYzine (ATARAX) 25 MG tablet     LISINOPRIL PO     multivitamin, therapeutic with minerals (MULTI-VITAMIN) TABS tablet     OMEPRAZOLE PO     ondansetron (ZOFRAN ODT) 4 MG ODT tab     senna-docusate (SENOKOT-S;PERICOLACE) 8.6-50 MG per tablet     SIMVASTATIN PO     SUMATRIPTAN SUCCINATE PO     traMADol (ULTRAM) 50 MG tablet            Allergies   Allergen Reactions     Penicillins Rash         Physical Exam  /83   Pulse 97   Temp 98.6  F (37  C) (Temporal)   Resp 16   Wt 78.8 kg (173 lb 11.6 oz)   SpO2 97%   BMI 31.77 kg/m      HEENT: dry mucus membranes  Neck: supple  CV: ppi, regular   Resp: speaking in full sentences with any resp distress  Abd: soft, no localizing tenderness palpation or distention  Ext: Mild bilateral lower extremity edema  Skin: warm dry well perfused  Neuro: Alert, no gross motor or sensory deficits,  gait stable      Labs and Imaging:    Labs Ordered and Resulted from Time of ED Arrival Up to the Time of Departure from the ED   CBC WITH PLATELETS DIFFERENTIAL - Abnormal; Notable for the following components:       Result Value    Absolute Lymphocytes 0.6 (*)     All other components  within normal limits   COMPREHENSIVE METABOLIC PANEL - Abnormal; Notable for the following components:    Potassium 3.2 (*)     Glucose 131 (*)     Urea Nitrogen 41 (*)     Creatinine 1.38 (*)     GFR Estimate 38 (*)     GFR Estimate If Black 44 (*)     All other components within normal limits   LIPASE - Abnormal; Notable for the following components:    Lipase 498 (*)     All other components within normal limits   ROUTINE UA WITH MICROSCOPIC - Abnormal; Notable for the following components:    Blood Urine Small (*)     Protein Albumin Urine 30 (*)     Leukocyte Esterase Urine Small (*)     WBC Urine 8 (*)     RBC Urine 7 (*)     Bacteria Urine Few (*)     Squamous Epithelial /HPF Urine 8 (*)     Mucous Urine Present (*)     Hyaline Casts 26 (*)     All other components within normal limits   COVID-19 VIRUS (CORONAVIRUS) BY PCR         No orders to display         Medications Administered in ED:  Medications   lactated ringers BOLUS 1,000 mL (0 mLs Intravenous Stopped 20 1601)   ondansetron (ZOFRAN) injection 4 mg (4 mg Intravenous Given 20 1451)   lidocaine (XYLOCAINE) 2 % 15 mL, alum & mag hydroxide-simethicone (MAALOX) 15 mL GI Cocktail (30 mLs Oral Given 20 1451)   lactated ringers BOLUS 1,000 mL (0 mLs Intravenous Stopped 20 1648)           Medical Decision Makin-year-old female presenting with generalized weakness, dehydration due to recent vomiting diarrhea.  No diarrhea since 2 this morning and no vomiting since leaving the emergency room yesterday.  She has a benign abdominal examination and stable vitals here.  She improved with the above therapies here in the emergency department.  UA unimpressive for urinary tract infection mild hypokalemia and renal insufficiency likely due to the diarrhea and dehydration.  Suspect this will improve with cessation of the vomiting/diarrhea and crystalloid fluid she received here.  She felt comfortable going home.      Diagnosis:     ICD-10-CM    1. Diarrhea, unspecified type  R19.7    2. Dehydration  E86.0    3. Malaise and fatigue  R53.81     R53.83        Disposition:    Home    Malcolm Torrez MD  [facility]  [facility]                     Malcolm Torrez MD  11/24/20 0219

## 2020-11-24 NOTE — ED TRIAGE NOTES
Patient was seen yesterday for nausea and vomiting, last night developed severe diarrhea, 7 episodes since. Has not had any since 0200. Patient notes she is not vomiting anymore. ABCs intact.

## 2020-11-25 LAB
SARS-COV-2 RNA SPEC QL NAA+PROBE: NOT DETECTED
SPECIMEN SOURCE: NORMAL

## 2020-11-27 ENCOUNTER — TELEPHONE (OUTPATIENT)
Dept: EMERGENCY MEDICINE | Facility: CLINIC | Age: 75
End: 2020-11-27

## 2020-11-27 NOTE — TELEPHONE ENCOUNTER
Coronavirus (COVID-19) Notification    Lab Result   Lab test 2019-nCoV rRt-PCR OR SARS-COV-2 PCR    Nasopharyngeal AND/OR Oropharyngeal swab is NEGATIVE for 2019-nCoV RNA [OR] SARS-COV-2 RNA (COVID-19) RNA    Your result was negative. This means that we didn't find the virus that causes COVID-19 in your sample. A test may show negative when you do actually have the virus. This can happen when the virus is in the early stages of infection, before you feel illness symptoms.    If you have symptoms   Stay home and away from others (self-isolate) until you meet ALL of the guidelines below:    You've had no fever--and no medicine that reduces fever--for 1 full day (24 hours). And      Your other symptoms have gotten better. For example, your cough or breathing has improved. And   ; At least 10 days have passed since your symptoms started. (If you've been told by a doctor that you have a weak immune system, wait 20 days.)         During this time:    Stay home. Don't go to work, school or anywhere else.     Stay in your own room, including for meals. Use your own bathroom if you can.    Stay away from others in your home. No hugging, kissing or shaking hands. No visitors.    Clean  high touch  surfaces often (doorknobs, counters, handles, etc.). Use a household cleaning spray or wipes. You can find a full list on the EPA website at www.epa.gov/pesticide-registration/list-n-disinfectants-use-against-sars-cov-2.    Cover your mouth and nose with a mask, tissue or other face covering to avoid spreading germs.    Wash your hands and face often with soap and water.    Going back to work  Check with your employer for any guidelines to follow for going back to work.  You are sent a letter for your Employer which will serve as formal document notice that you, the employee, tested negative for COVID-19, as of the testing date shown above.    If your symptoms worsen or other concerning symptoms, contact PCP, oncare or consider  returning to Emergency Dept.    Where can I get more information?    [facility]: [email or web address]    Coronavirus Basics: [email or web address]/diseases/coronavirus/basics.html    [facility] Hotline ([phone])    {Name]  Wood Loza RN  [facility] [place] [facility]  Emergency Dept Lab Result RN  Ph# [phone]

## (undated) DEVICE — GLOVE PROTEXIS W/NEU-THERA 7.0  2D73TE70

## (undated) DEVICE — SOL NACL 0.9% IRRIG 1000ML BOTTLE 2F7124

## (undated) DEVICE — NDL BLUNT 18GA 1" W/O FILTER 305181

## (undated) DEVICE — SUCTION MANIFOLD NEPTUNE 2 SYS 4 PORT 0702-020-000

## (undated) DEVICE — SU VICRYL 2-0 CT-1 27" UND J259H

## (undated) DEVICE — SU ETHIBOND 0 CT-1 CR 8X18" CX21D

## (undated) DEVICE — SYR 03ML LL W/O NDL 309657

## (undated) DEVICE — DRAIN HEMOVAC RESERVOIR KIT 10FR 1/8" MED 00-2550-002-10

## (undated) DEVICE — GLOVE PROTEXIS POWDER FREE 7.0 ORTHOPEDIC 2D73ET70

## (undated) DEVICE — CATH TRAY FOLEY COUDE SURESTEP 16FR W/DRN BAG LATEX A304416A

## (undated) DEVICE — LINEN FULL SHEET 5511

## (undated) DEVICE — SYR 30ML LL W/O NDL 302832

## (undated) DEVICE — SOL WATER IRRIG 1000ML BOTTLE 2F7114

## (undated) DEVICE — SU VICRYL 1 CT-1 27" J341H

## (undated) DEVICE — NDL SPINAL 18GA 3.5" 405184

## (undated) DEVICE — BLADE SAW SAGITTAL STRK 13X90X1.27MM HD SYS 6 6113-127-090

## (undated) DEVICE — GLOVE PROTEXIS W/NEU-THERA 8.5  2D73TE85

## (undated) DEVICE — GLOVE PROTEXIS POWDER FREE 8.5 ORTHOPEDIC 2D73ET85

## (undated) DEVICE — GLOVE PROTEXIS BLUE W/NEU-THERA 7.0  2D73EB70

## (undated) DEVICE — LINEN ORTHO ACL PACK 5447

## (undated) DEVICE — CAST PADDING 6" STERILE 9046S

## (undated) DEVICE — GLOVE PROTEXIS BLUE W/NEU-THERA 8.5  2D73EB85

## (undated) DEVICE — BAG CLEAR TRASH 1.3M 39X33" P4040C

## (undated) DEVICE — PACK TOTAL KNEE BOXED LATEX FREE PO15TKFCT

## (undated) DEVICE — DRSG AQUACEL AG 3.5X9.75" HYDROFIBER 412011

## (undated) DEVICE — BLADE SAW SAGITTAL STRK 25X90X1.27MM HD SYS 6 6125-127-090

## (undated) DEVICE — TOURNIQUET CUFF 30" REPRO BLUE 60-7070-105

## (undated) RX ORDER — DEXAMETHASONE SODIUM PHOSPHATE 4 MG/ML
INJECTION, SOLUTION INTRA-ARTICULAR; INTRALESIONAL; INTRAMUSCULAR; INTRAVENOUS; SOFT TISSUE
Status: DISPENSED
Start: 2017-09-25

## (undated) RX ORDER — FENTANYL CITRATE 50 UG/ML
INJECTION, SOLUTION INTRAMUSCULAR; INTRAVENOUS
Status: DISPENSED
Start: 2017-09-25

## (undated) RX ORDER — GLYCOPYRROLATE 0.2 MG/ML
INJECTION INTRAMUSCULAR; INTRAVENOUS
Status: DISPENSED
Start: 2017-09-25

## (undated) RX ORDER — LIDOCAINE HYDROCHLORIDE 10 MG/ML
INJECTION, SOLUTION EPIDURAL; INFILTRATION; INTRACAUDAL; PERINEURAL
Status: DISPENSED
Start: 2017-09-25

## (undated) RX ORDER — HYDROMORPHONE HYDROCHLORIDE 1 MG/ML
INJECTION, SOLUTION INTRAMUSCULAR; INTRAVENOUS; SUBCUTANEOUS
Status: DISPENSED
Start: 2017-09-25

## (undated) RX ORDER — GABAPENTIN 100 MG/1
CAPSULE ORAL
Status: DISPENSED
Start: 2017-09-25

## (undated) RX ORDER — KETOROLAC TROMETHAMINE 30 MG/ML
INJECTION, SOLUTION INTRAMUSCULAR; INTRAVENOUS
Status: DISPENSED
Start: 2017-09-25

## (undated) RX ORDER — ONDANSETRON 2 MG/ML
INJECTION INTRAMUSCULAR; INTRAVENOUS
Status: DISPENSED
Start: 2017-09-25

## (undated) RX ORDER — PROPOFOL 10 MG/ML
INJECTION, EMULSION INTRAVENOUS
Status: DISPENSED
Start: 2017-09-25

## (undated) RX ORDER — CEFAZOLIN SODIUM 2 G/100ML
INJECTION, SOLUTION INTRAVENOUS
Status: DISPENSED
Start: 2017-09-25

## (undated) RX ORDER — OXYCODONE HCL 10 MG/1
TABLET, FILM COATED, EXTENDED RELEASE ORAL
Status: DISPENSED
Start: 2017-09-25

## (undated) RX ORDER — SCOLOPAMINE TRANSDERMAL SYSTEM 1 MG/1
PATCH, EXTENDED RELEASE TRANSDERMAL
Status: DISPENSED
Start: 2017-09-25

## (undated) RX ORDER — BUPIVACAINE HYDROCHLORIDE AND EPINEPHRINE 2.5; 5 MG/ML; UG/ML
INJECTION, SOLUTION EPIDURAL; INFILTRATION; INTRACAUDAL; PERINEURAL
Status: DISPENSED
Start: 2017-09-25

## (undated) RX ORDER — CELECOXIB 200 MG/1
CAPSULE ORAL
Status: DISPENSED
Start: 2017-09-25